# Patient Record
Sex: FEMALE | Race: OTHER | Employment: PART TIME | URBAN - METROPOLITAN AREA
[De-identification: names, ages, dates, MRNs, and addresses within clinical notes are randomized per-mention and may not be internally consistent; named-entity substitution may affect disease eponyms.]

---

## 2017-01-24 ENCOUNTER — OFFICE VISIT (OUTPATIENT)
Dept: INTERNAL MEDICINE | Age: 47
End: 2017-01-24

## 2017-01-24 VITALS
HEIGHT: 60 IN | WEIGHT: 127 LBS | DIASTOLIC BLOOD PRESSURE: 80 MMHG | BODY MASS INDEX: 24.94 KG/M2 | SYSTOLIC BLOOD PRESSURE: 110 MMHG | HEART RATE: 65 BPM | TEMPERATURE: 97.6 F

## 2017-01-24 DIAGNOSIS — F43.23 ADJUSTMENT DISORDER WITH MIXED ANXIETY AND DEPRESSED MOOD: ICD-10-CM

## 2017-01-24 DIAGNOSIS — N95.1 HOT FLUSHES, PERIMENOPAUSAL: ICD-10-CM

## 2017-01-24 DIAGNOSIS — Z00.00 ANNUAL PHYSICAL EXAM: Primary | ICD-10-CM

## 2017-01-24 PROCEDURE — 99396 PREV VISIT EST AGE 40-64: CPT | Performed by: INTERNAL MEDICINE

## 2017-01-24 ASSESSMENT — ENCOUNTER SYMPTOMS
PHOTOPHOBIA: 0
ABDOMINAL DISTENTION: 0
SHORTNESS OF BREATH: 0
RESPIRATORY NEGATIVE: 1
COLOR CHANGE: 0
SINUS PRESSURE: 0
EYE PAIN: 0
GASTROINTESTINAL NEGATIVE: 1
CHEST TIGHTNESS: 0
BLOOD IN STOOL: 0
COUGH: 0
ABDOMINAL PAIN: 0

## 2017-01-24 ASSESSMENT — PATIENT HEALTH QUESTIONNAIRE - PHQ9
1. LITTLE INTEREST OR PLEASURE IN DOING THINGS: 0
SUM OF ALL RESPONSES TO PHQ9 QUESTIONS 1 & 2: 0
SUM OF ALL RESPONSES TO PHQ QUESTIONS 1-9: 0
2. FEELING DOWN, DEPRESSED OR HOPELESS: 0

## 2017-01-25 ENCOUNTER — OFFICE VISIT (OUTPATIENT)
Dept: SURGERY | Age: 47
End: 2017-01-25

## 2017-01-25 VITALS
BODY MASS INDEX: 25.72 KG/M2 | DIASTOLIC BLOOD PRESSURE: 70 MMHG | HEART RATE: 60 BPM | WEIGHT: 131 LBS | HEIGHT: 60 IN | SYSTOLIC BLOOD PRESSURE: 110 MMHG

## 2017-01-25 DIAGNOSIS — N95.1 POST MENOPAUSAL SYNDROME: ICD-10-CM

## 2017-01-25 DIAGNOSIS — E03.9 HYPOTHYROIDISM, UNSPECIFIED TYPE: Primary | ICD-10-CM

## 2017-01-25 DIAGNOSIS — E55.9 VITAMIN D DEFICIENCY: ICD-10-CM

## 2017-01-25 DIAGNOSIS — E78.00 HYPERCHOLESTEROLEMIA: ICD-10-CM

## 2017-01-25 PROCEDURE — 99213 OFFICE O/P EST LOW 20 MIN: CPT | Performed by: INTERNAL MEDICINE

## 2017-01-25 PROCEDURE — G8484 FLU IMMUNIZE NO ADMIN: HCPCS | Performed by: INTERNAL MEDICINE

## 2017-01-25 PROCEDURE — G8419 CALC BMI OUT NRM PARAM NOF/U: HCPCS | Performed by: INTERNAL MEDICINE

## 2017-01-25 PROCEDURE — 1036F TOBACCO NON-USER: CPT | Performed by: INTERNAL MEDICINE

## 2017-01-25 PROCEDURE — G8427 DOCREV CUR MEDS BY ELIG CLIN: HCPCS | Performed by: INTERNAL MEDICINE

## 2017-01-25 RX ORDER — ERGOCALCIFEROL 1.25 MG/1
50000 CAPSULE ORAL WEEKLY
Qty: 12 CAPSULE | Refills: 6 | Status: SHIPPED | OUTPATIENT
Start: 2017-01-25 | End: 2017-06-14 | Stop reason: SDUPTHER

## 2017-01-25 RX ORDER — LEVOTHYROXINE SODIUM 125 MCG
125 TABLET ORAL DAILY
Qty: 30 TABLET | Refills: 6 | Status: SHIPPED | OUTPATIENT
Start: 2017-01-25 | End: 2017-06-14 | Stop reason: SDUPTHER

## 2017-06-14 DIAGNOSIS — E55.9 VITAMIN D DEFICIENCY: ICD-10-CM

## 2017-06-14 DIAGNOSIS — E03.9 HYPOTHYROIDISM, UNSPECIFIED TYPE: ICD-10-CM

## 2017-06-14 RX ORDER — ERGOCALCIFEROL 1.25 MG/1
50000 CAPSULE ORAL WEEKLY
Qty: 12 CAPSULE | Refills: 0 | Status: SHIPPED | OUTPATIENT
Start: 2017-06-14 | End: 2017-12-07 | Stop reason: SDUPTHER

## 2017-06-14 RX ORDER — LEVOTHYROXINE SODIUM 125 MCG
125 TABLET ORAL DAILY
Qty: 90 TABLET | Refills: 0 | Status: SHIPPED | OUTPATIENT
Start: 2017-06-14 | End: 2017-11-30 | Stop reason: SDUPTHER

## 2017-11-30 DIAGNOSIS — E03.9 HYPOTHYROIDISM, UNSPECIFIED TYPE: ICD-10-CM

## 2017-11-30 RX ORDER — LEVOTHYROXINE SODIUM 125 MCG
125 TABLET ORAL DAILY
Qty: 28 TABLET | Refills: 0 | COMMUNITY
Start: 2017-11-30 | End: 2017-12-07 | Stop reason: SDUPTHER

## 2017-11-30 RX ORDER — LEVOTHYROXINE SODIUM 125 MCG
125 TABLET ORAL DAILY
Qty: 90 TABLET | Refills: 0 | Status: SHIPPED | OUTPATIENT
Start: 2017-11-30 | End: 2017-11-30 | Stop reason: SDUPTHER

## 2017-12-07 DIAGNOSIS — E03.9 HYPOTHYROIDISM, UNSPECIFIED TYPE: ICD-10-CM

## 2017-12-07 DIAGNOSIS — E55.9 VITAMIN D DEFICIENCY: ICD-10-CM

## 2017-12-07 RX ORDER — LEVOTHYROXINE SODIUM 125 MCG
125 TABLET ORAL DAILY
Qty: 90 TABLET | Refills: 1 | Status: SHIPPED | OUTPATIENT
Start: 2017-12-07 | End: 2017-12-08 | Stop reason: SDUPTHER

## 2017-12-07 RX ORDER — ERGOCALCIFEROL 1.25 MG/1
50000 CAPSULE ORAL WEEKLY
Qty: 12 CAPSULE | Refills: 1 | Status: SHIPPED | OUTPATIENT
Start: 2017-12-07 | End: 2018-02-22 | Stop reason: SDUPTHER

## 2017-12-08 DIAGNOSIS — E03.9 HYPOTHYROIDISM, UNSPECIFIED TYPE: ICD-10-CM

## 2017-12-08 RX ORDER — LEVOTHYROXINE SODIUM 125 MCG
125 TABLET ORAL DAILY
Qty: 90 TABLET | Refills: 1 | Status: SHIPPED | OUTPATIENT
Start: 2017-12-08 | End: 2017-12-14 | Stop reason: SDUPTHER

## 2017-12-14 DIAGNOSIS — E03.9 HYPOTHYROIDISM, UNSPECIFIED TYPE: ICD-10-CM

## 2017-12-14 RX ORDER — LEVOTHYROXINE SODIUM 125 MCG
125 TABLET ORAL DAILY
Qty: 90 TABLET | Refills: 1 | Status: SHIPPED | OUTPATIENT
Start: 2017-12-14 | End: 2018-02-22 | Stop reason: SDUPTHER

## 2018-02-22 DIAGNOSIS — E03.9 HYPOTHYROIDISM, UNSPECIFIED TYPE: ICD-10-CM

## 2018-02-22 DIAGNOSIS — E55.9 VITAMIN D DEFICIENCY: ICD-10-CM

## 2018-02-22 RX ORDER — ERGOCALCIFEROL 1.25 MG/1
50000 CAPSULE ORAL WEEKLY
Qty: 12 CAPSULE | Refills: 0 | Status: SHIPPED | OUTPATIENT
Start: 2018-02-22 | End: 2018-06-19 | Stop reason: SDUPTHER

## 2018-02-22 RX ORDER — LEVOTHYROXINE SODIUM 125 MCG
125 TABLET ORAL DAILY
Qty: 90 TABLET | Refills: 0 | Status: SHIPPED | OUTPATIENT
Start: 2018-02-22 | End: 2018-09-25 | Stop reason: SDUPTHER

## 2018-02-22 NOTE — TELEPHONE ENCOUNTER
Patient called to inform the office that she can no longer use local pharmacy for scripts. She now needs Mail away 90 day supply.

## 2018-03-02 ENCOUNTER — TELEPHONE (OUTPATIENT)
Dept: INTERNAL MEDICINE CLINIC | Age: 48
End: 2018-03-02

## 2018-03-06 DIAGNOSIS — Z00.00 ENCOUNTER FOR ANNUAL PHYSICAL EXAM: Primary | ICD-10-CM

## 2018-03-23 ENCOUNTER — OFFICE VISIT (OUTPATIENT)
Dept: INTERNAL MEDICINE CLINIC | Age: 48
End: 2018-03-23
Payer: COMMERCIAL

## 2018-03-23 VITALS
TEMPERATURE: 98.4 F | HEART RATE: 62 BPM | HEIGHT: 60 IN | DIASTOLIC BLOOD PRESSURE: 80 MMHG | SYSTOLIC BLOOD PRESSURE: 110 MMHG | OXYGEN SATURATION: 99 % | BODY MASS INDEX: 24.94 KG/M2 | WEIGHT: 127 LBS

## 2018-03-23 DIAGNOSIS — Z00.00 ENCOUNTER FOR ANNUAL PHYSICAL EXAM: ICD-10-CM

## 2018-03-23 DIAGNOSIS — Z00.00 ANNUAL PHYSICAL EXAM: Primary | ICD-10-CM

## 2018-03-23 LAB
ANION GAP SERPL CALCULATED.3IONS-SCNC: 15 MEQ/L (ref 7–13)
BACTERIA: NORMAL /HPF
BILIRUBIN URINE: NEGATIVE
BLOOD, URINE: ABNORMAL
BUN BLDV-MCNC: 14 MG/DL (ref 6–20)
CALCIUM SERPL-MCNC: 9.4 MG/DL (ref 8.6–10.2)
CHLORIDE BLD-SCNC: 102 MEQ/L (ref 98–107)
CHOLESTEROL, TOTAL: 245 MG/DL (ref 0–199)
CLARITY: ABNORMAL
CO2: 25 MEQ/L (ref 22–29)
COLOR: YELLOW
CREAT SERPL-MCNC: 0.46 MG/DL (ref 0.5–0.9)
EPITHELIAL CELLS, UA: NORMAL /HPF
GFR AFRICAN AMERICAN: >60
GFR NON-AFRICAN AMERICAN: >60
GLUCOSE BLD-MCNC: 86 MG/DL (ref 74–109)
GLUCOSE URINE: NEGATIVE MG/DL
HCT VFR BLD CALC: 41.1 % (ref 37–47)
HDLC SERPL-MCNC: 77 MG/DL (ref 40–59)
HEMOGLOBIN: 14 G/DL (ref 12–16)
KETONES, URINE: NEGATIVE MG/DL
LDL CHOLESTEROL CALCULATED: 153 MG/DL (ref 0–129)
LEUKOCYTE ESTERASE, URINE: NEGATIVE
MCH RBC QN AUTO: 33.3 PG (ref 27–31.3)
MCHC RBC AUTO-ENTMCNC: 34 % (ref 33–37)
MCV RBC AUTO: 97.8 FL (ref 82–100)
NITRITE, URINE: NEGATIVE
PDW BLD-RTO: 13.6 % (ref 11.5–14.5)
PH UA: 5.5 (ref 5–9)
PLATELET # BLD: 167 K/UL (ref 130–400)
POTASSIUM SERPL-SCNC: 4.7 MEQ/L (ref 3.5–5.1)
PROTEIN UA: NEGATIVE MG/DL
RBC # BLD: 4.2 M/UL (ref 4.2–5.4)
RBC UA: NORMAL /HPF (ref 0–2)
SODIUM BLD-SCNC: 142 MEQ/L (ref 132–144)
SPECIFIC GRAVITY UA: 1 (ref 1–1.03)
TRIGL SERPL-MCNC: 75 MG/DL (ref 0–200)
UROBILINOGEN, URINE: 0.2 E.U./DL
WBC # BLD: 2.9 K/UL (ref 4.8–10.8)
WBC UA: NORMAL /HPF (ref 0–5)

## 2018-03-23 PROCEDURE — 99396 PREV VISIT EST AGE 40-64: CPT | Performed by: INTERNAL MEDICINE

## 2018-03-23 RX ORDER — CALCIUM CARBONATE 500(1250)
500 TABLET ORAL DAILY
COMMUNITY

## 2018-03-23 ASSESSMENT — ENCOUNTER SYMPTOMS
RHINORRHEA: 0
SHORTNESS OF BREATH: 0
COLOR CHANGE: 0
ABDOMINAL PAIN: 0
RESPIRATORY NEGATIVE: 1
VOICE CHANGE: 0
EYE PAIN: 0
ABDOMINAL DISTENTION: 0
CHEST TIGHTNESS: 0
GASTROINTESTINAL NEGATIVE: 1
PHOTOPHOBIA: 0
COUGH: 0
BLOOD IN STOOL: 0

## 2018-03-23 NOTE — PROGRESS NOTES
History of mammogram 03/20/2012    History of ovarian cyst 2013    Hypothyroidism     Dr Cee Alfred Raynaud phenomenon 2015       Past Surgical History:   Procedure Laterality Date    ABDOMINOPLASTY         Social History     Social History    Marital status:      Spouse name: N/A    Number of children: 3    Years of education: N/A     Occupational History    homemaker, Micaela [employer]      Social History Main Topics    Smoking status: Never Smoker    Smokeless tobacco: Never Used    Alcohol use No    Drug use: No    Sexual activity: Not on file     Other Topics Concern    Not on file     Social History Narrative    Native of [facility]    Lives in a house in [place] with spouse (pulmonologist)     3 children in college    Works at [employer]   Problem Relation Age of Onset    High Cholesterol Father        Family and social history were reviewed and pertinent changes documented. ALLERGIES    Patient has no known allergies. Current Outpatient Prescriptions on File Prior to Visit   Medication Sig Dispense Refill    vitamin D (ERGOCALCIFEROL) 06540 units CAPS capsule Take 1 capsule by mouth once a week 12 capsule 0    SYNTHROID 125 MCG tablet Take 1 tablet by mouth Daily 90 tablet 0    Omega-3 Fatty Acids (FISH OIL PO) Take  by mouth.  ferrous sulfate 325 (65 FE) MG tablet Take 325 mg by mouth daily (with breakfast).  MULTIPLE VITAMINS PO Take  by mouth.  levonorgestrel (MIRENA) 20 MCG/24HR IUD 1 each by Intrauterine route once for 1 dose. 1 Intra Uterine Device 0     No current facility-administered medications on file prior to visit. Review of Systems   Constitutional: Positive for diaphoresis (night sweats, once during the night). Negative for activity change, appetite change, fatigue and unexpected weight change. HENT: Negative for congestion, dental problem, nosebleeds, rhinorrhea, tinnitus and voice change.     Eyes: Negative for photophobia, pain Palpation reveals no dominant mass and no tenderness. The axillary and supraclavicular lymph nodes are not enlarged. Musculoskeletal: Normal range of motion. Lymphadenopathy:     She has no cervical adenopathy. Neurological: She is alert and oriented to person, place, and time. She has normal reflexes. Skin: Skin is warm and dry. No rash noted. Psychiatric: She has a normal mood and affect. Her speech is normal and behavior is normal. Judgment normal. Her mood appears not anxious. She is not slowed and not withdrawn. Cognition and memory are normal. She does not express inappropriate judgment. She does not exhibit a depressed mood. She exhibits normal recent memory and normal remote memory. Less anxiety, mood brighter She is attentive. Assessment:    Jazmin was seen today for annual exam and gynecologic exam.    Diagnoses and all orders for this visit:    Annual physical exam  Comments:  well exam, no new health issues  Has IUD which was placed 5 years ago by previous gynecologist.  Referred to gynecologist for IUD check up  Pap test deferred to gynecologist    Other orders  -     Cancel: Pap Smear  -     MD Evelina        Plan:    Reviewed with the patient (/and caregiver if present): current health status, medications, activities and diet. See also orders and comments in the assessment section. Patient given positive feedback and encouraged to continue the current healthy lifestyle, call with any concerns.     Orders Placed This Encounter   Procedures   MD Evelina     Referral Priority:   Routine     Referral Type:   Consult for Advice and Opinion     Referral Reason:   Specialty Services Required     Referred to Provider:   Wallace Buckley MD     Requested Specialty:   Obstetrics & Gynecology     Number of Visits Requested:   1     Return in about 1 year (around 3/23/2019) for annual.    I have reviewed the patient's medical and surgical, family

## 2018-04-04 ENCOUNTER — OFFICE VISIT (OUTPATIENT)
Dept: OBGYN CLINIC | Age: 48
End: 2018-04-04
Payer: COMMERCIAL

## 2018-04-04 ENCOUNTER — OFFICE VISIT (OUTPATIENT)
Dept: INTERNAL MEDICINE CLINIC | Age: 48
End: 2018-04-04
Payer: COMMERCIAL

## 2018-04-04 VITALS
SYSTOLIC BLOOD PRESSURE: 104 MMHG | HEIGHT: 60 IN | HEART RATE: 68 BPM | DIASTOLIC BLOOD PRESSURE: 66 MMHG | BODY MASS INDEX: 25.32 KG/M2 | WEIGHT: 129 LBS

## 2018-04-04 VITALS
HEIGHT: 60 IN | BODY MASS INDEX: 25.32 KG/M2 | OXYGEN SATURATION: 99 % | HEART RATE: 61 BPM | TEMPERATURE: 98.2 F | SYSTOLIC BLOOD PRESSURE: 100 MMHG | WEIGHT: 129 LBS | DIASTOLIC BLOOD PRESSURE: 70 MMHG

## 2018-04-04 DIAGNOSIS — E78.5 HYPERLIPIDEMIA, UNSPECIFIED HYPERLIPIDEMIA TYPE: Primary | Chronic | ICD-10-CM

## 2018-04-04 DIAGNOSIS — Z97.5 IUD (INTRAUTERINE DEVICE) IN PLACE: ICD-10-CM

## 2018-04-04 DIAGNOSIS — R31.29 HEMATURIA, MICROSCOPIC: ICD-10-CM

## 2018-04-04 DIAGNOSIS — Z12.31 VISIT FOR SCREENING MAMMOGRAM: ICD-10-CM

## 2018-04-04 DIAGNOSIS — Z01.419 VISIT FOR GYNECOLOGIC EXAMINATION: ICD-10-CM

## 2018-04-04 DIAGNOSIS — Z01.419 VISIT FOR GYNECOLOGIC EXAMINATION: Primary | ICD-10-CM

## 2018-04-04 PROCEDURE — 99212 OFFICE O/P EST SF 10 MIN: CPT | Performed by: INTERNAL MEDICINE

## 2018-04-04 PROCEDURE — 99386 PREV VISIT NEW AGE 40-64: CPT | Performed by: OBSTETRICS & GYNECOLOGY

## 2018-04-04 ASSESSMENT — ENCOUNTER SYMPTOMS
RESPIRATORY NEGATIVE: 1
GASTROINTESTINAL NEGATIVE: 1

## 2018-04-05 ENCOUNTER — OFFICE VISIT (OUTPATIENT)
Dept: ENDOCRINOLOGY | Age: 48
End: 2018-04-05
Payer: COMMERCIAL

## 2018-04-05 VITALS
SYSTOLIC BLOOD PRESSURE: 119 MMHG | WEIGHT: 127 LBS | DIASTOLIC BLOOD PRESSURE: 79 MMHG | BODY MASS INDEX: 24.94 KG/M2 | HEART RATE: 60 BPM | HEIGHT: 60 IN

## 2018-04-05 DIAGNOSIS — E03.9 HYPOTHYROIDISM, UNSPECIFIED TYPE: Primary | ICD-10-CM

## 2018-04-05 DIAGNOSIS — E78.00 HYPERCHOLESTEREMIA: ICD-10-CM

## 2018-04-05 DIAGNOSIS — Z01.419 VISIT FOR GYNECOLOGIC EXAMINATION: ICD-10-CM

## 2018-04-05 DIAGNOSIS — E55.9 VITAMIN D DEFICIENCY: ICD-10-CM

## 2018-04-05 LAB
FOLLICLE STIMULATING HORMONE: 151.5 MIU/ML
T4 FREE: 1.57 NG/DL (ref 0.93–1.7)

## 2018-04-05 PROCEDURE — 99213 OFFICE O/P EST LOW 20 MIN: CPT | Performed by: INTERNAL MEDICINE

## 2018-04-11 LAB
HPV COMMENT: NORMAL
HPV TYPE 16: NOT DETECTED
HPV TYPE 18: NOT DETECTED
HPVOH (OTHER TYPES): NOT DETECTED

## 2018-04-23 ENCOUNTER — HOSPITAL ENCOUNTER (OUTPATIENT)
Dept: ULTRASOUND IMAGING | Age: 48
Discharge: HOME | End: 2018-04-25
Payer: COMMERCIAL

## 2018-04-23 DIAGNOSIS — Z01.419 VISIT FOR GYNECOLOGIC EXAMINATION: ICD-10-CM

## 2018-04-23 PROCEDURE — 76830 TRANSVAGINAL US NON-OB: CPT

## 2018-04-23 PROCEDURE — 76856 US EXAM PELVIC COMPLETE: CPT

## 2018-04-25 ENCOUNTER — PROCEDURE VISIT (OUTPATIENT)
Dept: OBGYN CLINIC | Age: 48
End: 2018-04-25
Payer: COMMERCIAL

## 2018-04-25 VITALS
SYSTOLIC BLOOD PRESSURE: 124 MMHG | HEART RATE: 68 BPM | BODY MASS INDEX: 25 KG/M2 | WEIGHT: 128 LBS | DIASTOLIC BLOOD PRESSURE: 76 MMHG

## 2018-04-25 DIAGNOSIS — Z30.433 ENCOUNTER FOR IUD REMOVAL AND REINSERTION: Primary | ICD-10-CM

## 2018-04-25 DIAGNOSIS — N92.6 IRREGULAR BLEEDING: ICD-10-CM

## 2018-04-25 LAB
CONTROL: YES
PREGNANCY TEST URINE, POC: NORMAL

## 2018-04-25 PROCEDURE — 58300 INSERT INTRAUTERINE DEVICE: CPT | Performed by: OBSTETRICS & GYNECOLOGY

## 2018-04-25 PROCEDURE — 81025 URINE PREGNANCY TEST: CPT | Performed by: OBSTETRICS & GYNECOLOGY

## 2018-04-25 PROCEDURE — 58301 REMOVE INTRAUTERINE DEVICE: CPT | Performed by: OBSTETRICS & GYNECOLOGY

## 2018-04-25 PROCEDURE — 99999 PR OFFICE/OUTPT VISIT,PROCEDURE ONLY: CPT | Performed by: OBSTETRICS & GYNECOLOGY

## 2018-04-26 ENCOUNTER — EMPLOYEE WELLNESS (OUTPATIENT)
Dept: INTERNAL MEDICINE CLINIC | Age: 48
End: 2018-04-26

## 2018-04-26 LAB
CHOLESTEROL, TOTAL: 191 MG/DL (ref 0–199)
GLUCOSE BLD-MCNC: 66 MG/DL (ref 74–109)
HDLC SERPL-MCNC: 65 MG/DL (ref 40–59)
LDL CHOLESTEROL CALCULATED: 114 MG/DL (ref 0–129)
TRIGL SERPL-MCNC: 62 MG/DL (ref 0–200)

## 2018-05-02 VITALS — BODY MASS INDEX: 24.41 KG/M2 | WEIGHT: 125 LBS

## 2018-05-04 PROBLEM — Z12.31 VISIT FOR SCREENING MAMMOGRAM: Status: RESOLVED | Noted: 2018-04-04 | Resolved: 2018-05-04

## 2018-05-04 PROBLEM — Z01.419 VISIT FOR GYNECOLOGIC EXAMINATION: Status: RESOLVED | Noted: 2018-04-04 | Resolved: 2018-05-04

## 2018-05-08 ENCOUNTER — HOSPITAL ENCOUNTER (OUTPATIENT)
Dept: WOMENS IMAGING | Age: 48
Discharge: HOME | End: 2018-05-10
Payer: COMMERCIAL

## 2018-05-08 DIAGNOSIS — Z12.31 VISIT FOR SCREENING MAMMOGRAM: ICD-10-CM

## 2018-05-08 PROCEDURE — 77063 BREAST TOMOSYNTHESIS BI: CPT

## 2018-05-29 ENCOUNTER — OFFICE VISIT (OUTPATIENT)
Dept: OBGYN CLINIC | Age: 48
End: 2018-05-29
Payer: COMMERCIAL

## 2018-05-29 VITALS
BODY MASS INDEX: 24.54 KG/M2 | DIASTOLIC BLOOD PRESSURE: 78 MMHG | WEIGHT: 125 LBS | SYSTOLIC BLOOD PRESSURE: 106 MMHG | HEIGHT: 60 IN | HEART RATE: 80 BPM

## 2018-05-29 DIAGNOSIS — Z30.431 IUD CHECK UP: Primary | ICD-10-CM

## 2018-05-29 PROCEDURE — 99213 OFFICE O/P EST LOW 20 MIN: CPT | Performed by: OBSTETRICS & GYNECOLOGY

## 2018-06-19 DIAGNOSIS — E55.9 VITAMIN D DEFICIENCY: ICD-10-CM

## 2018-06-19 PROBLEM — Z30.431 IUD CHECK UP: Status: ACTIVE | Noted: 2018-06-19

## 2018-06-19 RX ORDER — ERGOCALCIFEROL 1.25 MG/1
50000 CAPSULE ORAL WEEKLY
Qty: 12 CAPSULE | Refills: 0 | Status: SHIPPED | OUTPATIENT
Start: 2018-06-19 | End: 2018-09-18 | Stop reason: SDUPTHER

## 2018-09-17 DIAGNOSIS — E55.9 VITAMIN D DEFICIENCY: ICD-10-CM

## 2018-09-18 ENCOUNTER — TELEPHONE (OUTPATIENT)
Dept: ENDOCRINOLOGY | Age: 48
End: 2018-09-18

## 2018-09-18 DIAGNOSIS — E55.9 VITAMIN D DEFICIENCY: ICD-10-CM

## 2018-09-18 RX ORDER — ERGOCALCIFEROL 1.25 MG/1
50000 CAPSULE ORAL WEEKLY
Qty: 12 CAPSULE | Refills: 0 | Status: CANCELLED | OUTPATIENT
Start: 2018-09-18

## 2018-09-18 RX ORDER — ERGOCALCIFEROL 1.25 MG/1
50000 CAPSULE ORAL WEEKLY
Qty: 12 CAPSULE | Refills: 0 | Status: SHIPPED | OUTPATIENT
Start: 2018-09-18 | End: 2019-05-24 | Stop reason: SDUPTHER

## 2018-09-18 NOTE — TELEPHONE ENCOUNTER
Patient is requesting to have 90 day supply for vitamin D to [facility]

## 2018-09-24 RX ORDER — ERGOCALCIFEROL 1.25 MG/1
50000 CAPSULE ORAL WEEKLY
Qty: 12 CAPSULE | Refills: 0 | OUTPATIENT
Start: 2018-09-24

## 2018-09-25 DIAGNOSIS — E03.9 HYPOTHYROIDISM, UNSPECIFIED TYPE: ICD-10-CM

## 2018-09-26 RX ORDER — LEVOTHYROXINE SODIUM 125 MCG
125 TABLET ORAL DAILY
Qty: 90 TABLET | Refills: 1 | Status: SHIPPED | OUTPATIENT
Start: 2018-09-26 | End: 2019-05-24 | Stop reason: SDUPTHER

## 2019-05-08 DIAGNOSIS — E55.9 VITAMIN D DEFICIENCY: ICD-10-CM

## 2019-05-08 DIAGNOSIS — E03.9 HYPOTHYROIDISM, UNSPECIFIED TYPE: ICD-10-CM

## 2019-05-08 RX ORDER — LEVOTHYROXINE SODIUM 125 MCG
125 TABLET ORAL DAILY
Qty: 90 TABLET | Refills: 1 | OUTPATIENT
Start: 2019-05-08

## 2019-05-08 RX ORDER — ERGOCALCIFEROL 1.25 MG/1
50000 CAPSULE ORAL WEEKLY
Qty: 12 CAPSULE | Refills: 0 | OUTPATIENT
Start: 2019-05-08

## 2019-05-14 ENCOUNTER — HOSPITAL ENCOUNTER (OUTPATIENT)
Dept: WOMENS IMAGING | Age: 49
Discharge: HOME | End: 2019-05-16
Payer: COMMERCIAL

## 2019-05-14 ENCOUNTER — TELEPHONE (OUTPATIENT)
Dept: INTERNAL MEDICINE CLINIC | Age: 49
End: 2019-05-14

## 2019-05-14 DIAGNOSIS — Z12.39 BREAST SCREENING: Primary | ICD-10-CM

## 2019-05-14 DIAGNOSIS — Z12.39 BREAST SCREENING: ICD-10-CM

## 2019-05-14 PROCEDURE — 77063 BREAST TOMOSYNTHESIS BI: CPT

## 2019-05-24 ENCOUNTER — OFFICE VISIT (OUTPATIENT)
Dept: ENDOCRINOLOGY | Age: 49
End: 2019-05-24
Payer: COMMERCIAL

## 2019-05-24 VITALS
SYSTOLIC BLOOD PRESSURE: 114 MMHG | HEART RATE: 68 BPM | BODY MASS INDEX: 24.94 KG/M2 | WEIGHT: 127 LBS | DIASTOLIC BLOOD PRESSURE: 79 MMHG | HEIGHT: 60 IN

## 2019-05-24 DIAGNOSIS — E55.9 VITAMIN D DEFICIENCY: ICD-10-CM

## 2019-05-24 DIAGNOSIS — N95.9 POST MENOPAUSAL PROBLEMS: ICD-10-CM

## 2019-05-24 DIAGNOSIS — E03.9 HYPOTHYROIDISM, UNSPECIFIED TYPE: Primary | ICD-10-CM

## 2019-05-24 DIAGNOSIS — E78.00 HYPERCHOLESTEREMIA: ICD-10-CM

## 2019-05-24 PROCEDURE — 99213 OFFICE O/P EST LOW 20 MIN: CPT | Performed by: INTERNAL MEDICINE

## 2019-05-24 RX ORDER — LEVOTHYROXINE SODIUM 125 MCG
125 TABLET ORAL DAILY
Qty: 90 TABLET | Refills: 3 | Status: SHIPPED | OUTPATIENT
Start: 2019-05-24 | End: 2019-08-26 | Stop reason: SDUPTHER

## 2019-05-24 RX ORDER — LEVOTHYROXINE SODIUM 125 MCG
125 TABLET ORAL DAILY
Qty: 90 TABLET | Refills: 3 | Status: SHIPPED | OUTPATIENT
Start: 2019-05-24 | End: 2019-05-24 | Stop reason: SDUPTHER

## 2019-05-24 RX ORDER — ERGOCALCIFEROL 1.25 MG/1
50000 CAPSULE ORAL WEEKLY
Qty: 12 CAPSULE | Refills: 0 | Status: SHIPPED | OUTPATIENT
Start: 2019-05-24 | End: 2019-08-26 | Stop reason: SDUPTHER

## 2019-06-02 NOTE — PROGRESS NOTES
Subjective:      Patient ID: Susanne Mckeon is a 47 y.o. female. 12 month f/u on hypothyroidism   Hyperlipidemia   This is a chronic problem. The current episode started more than 1 year ago. The problem is uncontrolled. Exacerbating diseases include hypothyroidism. Risk factors for coronary artery disease include dyslipidemia. Other   This is a chronic (hypothyroidism ) problem. The current episode started more than 1 year ago. The problem has been unchanged. Treatments tried: synthyroid teresa. The treatment provided moderate relief.        c/o  post menopausal symptoms wants labs done    No recent labs    Vit  D def on replacement     Patient Active Problem List   Diagnosis    History of mammogram    Hypothyroidism    Raynaud's phenomenon without gangrene    IUD (intrauterine device) in place    Encounter for IUD removal and reinsertion    Irregular bleeding    IUD check up       Allergies   Allergen Reactions    Doxycycline        Current Outpatient Medications:     vitamin D (ERGOCALCIFEROL) 24039 units CAPS capsule, Take 1 capsule by mouth once a week, Disp: 12 capsule, Rfl: 0    SYNTHROID 125 MCG tablet, Take 1 tablet by mouth Daily, Disp: 90 tablet, Rfl: 3    calcium carbonate (OSCAL) 500 MG TABS tablet, Take 500 mg by mouth daily, Disp: , Rfl:     Omega-3 Fatty Acids (FISH OIL PO), Take  by mouth., Disp: , Rfl:     ferrous sulfate 325 (65 FE) MG tablet, Take 325 mg by mouth daily (with breakfast). , Disp: , Rfl:     MULTIPLE VITAMINS PO, Take  by mouth.  , Disp: , Rfl:     levonorgestrel (MIRENA) 20 MCG/24HR IUD, 1 each by Intrauterine route once for 1 dose., Disp: 1 Intra Uterine Device, Rfl: 0    Review of Systems   All other systems reviewed and are negative. Vitals:    05/24/19 1537   BP: 114/79   Pulse: 68   Weight: 127 lb (57.6 kg)   Height: 5' (1.524 m)       Objective:   Physical Exam   Constitutional: She appears well-developed and well-nourished.    HENT:   Head: Normocephalic and atraumatic. Cardiovascular: Normal rate. Musculoskeletal: Normal range of motion. Neurological: She is alert. Assessment:       Diagnosis Orders   1. Hypothyroidism, unspecified type  TSH with Reflex    T4, Free    SYNTHROID 125 MCG tablet    DISCONTINUED: SYNTHROID 125 MCG tablet   2. Hypercholesteremia  Basic Metabolic Panel    Lipid Panel   3. Vitamin D deficiency  Vitamin D 25 Hydroxy    vitamin D (ERGOCALCIFEROL) 22167 units CAPS capsule   4.  Post menopausal problems  Follicle Stimulating Hormone    Luteinizing Hormone    Estradiol           Plan:      Orders Placed This Encounter   Procedures    TSH with Reflex     Standing Status:   Future     Standing Expiration Date:   5/24/2020    T4, Free     Standing Status:   Future     Standing Expiration Date:   5/24/2020    Basic Metabolic Panel     Standing Status:   Future     Standing Expiration Date:   5/24/2020    Lipid Panel     Standing Status:   Future     Standing Expiration Date:   5/24/2020     Order Specific Question:   Is Patient Fasting?/# of Hours     Answer:   y    Vitamin D 25 Hydroxy     Standing Status:   Future     Standing Expiration Date:   7/76/2285   Miguel Tobaraimee Follicle Stimulating Hormone     Standing Status:   Future     Standing Expiration Date:   5/24/2020    Luteinizing Hormone     Standing Status:   Future     Standing Expiration Date:   5/24/2020    Estradiol     Standing Status:   Future     Standing Expiration Date:   5/24/2020     Orders Placed This Encounter   Medications    DISCONTD: SYNTHROID 125 MCG tablet     Sig: Take 1 tablet by mouth Daily     Dispense:  90 tablet     Refill:  3    vitamin D (ERGOCALCIFEROL) 49843 units CAPS capsule     Sig: Take 1 capsule by mouth once a week     Dispense:  12 capsule     Refill:  0    SYNTHROID 125 MCG tablet     Sig: Take 1 tablet by mouth Daily     Dispense:  90 tablet     Refill:  3

## 2019-08-26 DIAGNOSIS — E55.9 VITAMIN D DEFICIENCY: ICD-10-CM

## 2019-08-26 DIAGNOSIS — E03.9 HYPOTHYROIDISM, UNSPECIFIED TYPE: ICD-10-CM

## 2019-08-26 RX ORDER — LEVOTHYROXINE SODIUM 125 MCG
125 TABLET ORAL DAILY
Qty: 90 TABLET | Refills: 3 | Status: SHIPPED | OUTPATIENT
Start: 2019-08-26 | End: 2020-01-21 | Stop reason: SDUPTHER

## 2019-08-26 RX ORDER — ERGOCALCIFEROL 1.25 MG/1
50000 CAPSULE ORAL WEEKLY
Qty: 12 CAPSULE | Refills: 0 | Status: SHIPPED | OUTPATIENT
Start: 2019-08-26 | End: 2019-11-21 | Stop reason: SDUPTHER

## 2019-08-26 NOTE — TELEPHONE ENCOUNTER
Please approve or deny this request.    Rx requested:  Requested Prescriptions     Pending Prescriptions Disp Refills    SYNTHROID 125 MCG tablet 90 tablet 3     Sig: Take 1 tablet by mouth Daily    vitamin D (ERGOCALCIFEROL) 37687 units CAPS capsule 12 capsule 0     Sig: Take 1 capsule by mouth once a week         Last Office Visit:   5/24/2019      Next Visit Date:  No future appointments.

## 2019-08-29 RX ORDER — CLINDAMYCIN PHOSPHATE AND TRETINOIN 10; .25 MG/G; MG/G
GEL TOPICAL
Qty: 180 G | Refills: 0 | Status: SHIPPED | OUTPATIENT
Start: 2019-08-29

## 2019-11-19 DIAGNOSIS — E55.9 VITAMIN D DEFICIENCY: ICD-10-CM

## 2019-11-21 RX ORDER — ERGOCALCIFEROL 1.25 MG/1
50000 CAPSULE ORAL WEEKLY
Qty: 12 CAPSULE | Refills: 0 | Status: SHIPPED | OUTPATIENT
Start: 2019-11-21 | End: 2020-01-21 | Stop reason: SDUPTHER

## 2020-01-21 ENCOUNTER — OFFICE VISIT (OUTPATIENT)
Dept: ENDOCRINOLOGY | Age: 50
End: 2020-01-21
Payer: COMMERCIAL

## 2020-01-21 VITALS
HEART RATE: 67 BPM | WEIGHT: 130 LBS | HEIGHT: 60 IN | DIASTOLIC BLOOD PRESSURE: 79 MMHG | SYSTOLIC BLOOD PRESSURE: 117 MMHG | BODY MASS INDEX: 25.52 KG/M2

## 2020-01-21 PROCEDURE — 99213 OFFICE O/P EST LOW 20 MIN: CPT | Performed by: INTERNAL MEDICINE

## 2020-01-21 RX ORDER — LEVOTHYROXINE SODIUM 125 MCG
125 TABLET ORAL DAILY
Qty: 90 TABLET | Refills: 3 | Status: SHIPPED | OUTPATIENT
Start: 2020-01-21 | End: 2020-04-27 | Stop reason: SDUPTHER

## 2020-01-21 RX ORDER — ERGOCALCIFEROL 1.25 MG/1
50000 CAPSULE ORAL WEEKLY
Qty: 12 CAPSULE | Refills: 3 | Status: SHIPPED | OUTPATIENT
Start: 2020-01-21 | End: 2021-01-11 | Stop reason: SDUPTHER

## 2020-01-27 NOTE — PROGRESS NOTES
Subjective:      Patient ID: Gema Alvarado is a 47 y.o. female. 7  month f/u on hypothyroidism hypocalcemia vitamin D deficiency  Patient complains of hot flashes night sweats  Hyperlipidemia   This is a chronic problem. The current episode started more than 1 year ago. The problem is uncontrolled. Exacerbating diseases include hypothyroidism. Other   This is a chronic (hypothyroidism ) problem. The current episode started more than 1 year ago. The problem has been unchanged. Treatments tried: synthyroid teresa. The treatment provided moderate relief. No recent labs to review      Patient Active Problem List   Diagnosis    History of mammogram    Hypothyroidism    Raynaud's phenomenon without gangrene    IUD (intrauterine device) in place    Encounter for IUD removal and reinsertion    Irregular bleeding    IUD check up       Allergies   Allergen Reactions    Doxycycline        Current Outpatient Medications:     SYNTHROID 125 MCG tablet, Take 1 tablet by mouth Daily, Disp: 90 tablet, Rfl: 3    vitamin D (ERGOCALCIFEROL) 1.25 MG (69180 UT) CAPS capsule, Take 1 capsule by mouth once a week, Disp: 12 capsule, Rfl: 3    clindamycin-tretinoin (ZIANA) 1.2-0.025 % gel, APPLY AS DIRECTED, Disp: 180 g, Rfl: 0    calcium carbonate (OSCAL) 500 MG TABS tablet, Take 500 mg by mouth daily, Disp: , Rfl:     Omega-3 Fatty Acids (FISH OIL PO), Take  by mouth., Disp: , Rfl:     ferrous sulfate 325 (65 FE) MG tablet, Take 325 mg by mouth daily (with breakfast). , Disp: , Rfl:     MULTIPLE VITAMINS PO, Take  by mouth.  , Disp: , Rfl:     levonorgestrel (MIRENA) 20 MCG/24HR IUD, 1 each by Intrauterine route once for 1 dose., Disp: 1 Intra Uterine Device, Rfl: 0    Review of Systems   All other systems reviewed and are negative. Vitals:    01/21/20 1544   BP: 117/79   Pulse: 67   Weight: 130 lb (59 kg)   Height: 5' (1.524 m)       Objective:   Physical Exam  Constitutional:       Appearance: Normal appearance. She is well-developed. HENT:      Head: Normocephalic and atraumatic. Neck:      Musculoskeletal: Normal range of motion and neck supple. Cardiovascular:      Rate and Rhythm: Normal rate. Musculoskeletal: Normal range of motion. Neurological:      Mental Status: She is alert. Psychiatric:         Mood and Affect: Mood normal.           Assessment:       Diagnosis Orders   1. Hyperlipidemia, unspecified hyperlipidemia type  Lipid Panel   2. Hypothyroidism, unspecified type  TSH with Reflex    T4, Free    SYNTHROID 125 MCG tablet   3. Vitamin D deficiency  vitamin D (ERGOCALCIFEROL) 1.25 MG (05039 UT) CAPS capsule   4.  Post-menopausal  Follicle Stimulating Hormone    Luteinizing Hormone    Estradiol    DEXA BONE DENSITY AXIAL SKELETON           Plan:      Orders Placed This Encounter   Procedures    DEXA BONE DENSITY AXIAL SKELETON     Standing Status:   Future     Standing Expiration Date:   1/21/2021     Order Specific Question:   Reason for exam:     Answer:   post menopause    TSH with Reflex     Standing Status:   Future     Standing Expiration Date:   1/21/2021    T4, Free     Standing Status:   Future     Standing Expiration Date:   1/21/2021    Lipid Panel     Standing Status:   Future     Standing Expiration Date:   1/21/2021     Order Specific Question:   Is Patient Fasting?/# of Hours     Answer:   y    Follicle Stimulating Hormone     Standing Status:   Future     Standing Expiration Date:   1/21/2021    Luteinizing Hormone     Standing Status:   Future     Standing Expiration Date:   1/21/2021    Estradiol     Standing Status:   Future     Standing Expiration Date:   1/21/2021     Orders Placed This Encounter   Medications    SYNTHROID 125 MCG tablet     Sig: Take 1 tablet by mouth Daily     Dispense:  90 tablet     Refill:  3    vitamin D (ERGOCALCIFEROL) 1.25 MG (73495 UT) CAPS capsule     Sig: Take 1 capsule by mouth once a week     Dispense:  12 capsule     Refill:  3 Continue vitamin D and Synthroid will get lab work for postmenopausal symptoms BMP A1c thyroid panel lipid panel  Will also order bone density

## 2020-02-11 DIAGNOSIS — E03.9 HYPOTHYROIDISM, UNSPECIFIED TYPE: ICD-10-CM

## 2020-02-11 DIAGNOSIS — Z78.0 POST-MENOPAUSAL: ICD-10-CM

## 2020-02-11 DIAGNOSIS — E78.5 HYPERLIPIDEMIA, UNSPECIFIED HYPERLIPIDEMIA TYPE: ICD-10-CM

## 2020-02-11 LAB
CHOLESTEROL, TOTAL: 206 MG/DL (ref 0–199)
ESTRADIOL LEVEL: <5 PG/ML
FOLLICLE STIMULATING HORMONE: 120 MIU/ML
HDLC SERPL-MCNC: 68 MG/DL (ref 40–59)
LDL CHOLESTEROL CALCULATED: 122 MG/DL (ref 0–129)
LUTEINIZING HORMONE: 51.1 MIU/ML
T4 FREE: 1.27 NG/DL (ref 0.84–1.68)
TRIGL SERPL-MCNC: 82 MG/DL (ref 0–150)
TSH REFLEX: 3.37 UIU/ML (ref 0.44–3.86)

## 2020-02-18 ENCOUNTER — HOSPITAL ENCOUNTER (OUTPATIENT)
Dept: WOMENS IMAGING | Age: 50
End: 2020-02-18
Payer: COMMERCIAL

## 2020-02-25 ENCOUNTER — HOSPITAL ENCOUNTER (OUTPATIENT)
Dept: WOMENS IMAGING | Age: 50
Discharge: HOME | End: 2020-02-27
Payer: COMMERCIAL

## 2020-02-25 PROCEDURE — 77080 DXA BONE DENSITY AXIAL: CPT

## 2020-03-03 ENCOUNTER — TELEPHONE (OUTPATIENT)
Dept: ENDOCRINOLOGY | Age: 50
End: 2020-03-03

## 2020-03-03 NOTE — TELEPHONE ENCOUNTER
Called patient to advise her of her Dexa scan results that were normal.  She asked if the test told us if she was port-menopausal yet?     Please advise at [phone]

## 2020-04-27 RX ORDER — LEVOTHYROXINE SODIUM 125 MCG
125 TABLET ORAL DAILY
Qty: 90 TABLET | Refills: 3 | Status: SHIPPED | OUTPATIENT
Start: 2020-04-27 | End: 2020-08-24 | Stop reason: SDUPTHER

## 2020-04-27 NOTE — TELEPHONE ENCOUNTER
Requested Prescriptions     Pending Prescriptions Disp Refills    SYNTHROID 125 MCG tablet 90 tablet 3     Sig: Take 1 tablet by mouth Daily

## 2020-06-03 ENCOUNTER — TELEPHONE (OUTPATIENT)
Dept: ADMINISTRATIVE | Age: 50
End: 2020-06-03

## 2020-06-04 NOTE — TELEPHONE ENCOUNTER
A 90 day supply was sent in with a years worth of refills a couple months ago, LM for pt to see what was going on cause she should be able to just go to pharmacy and get

## 2020-06-29 ENCOUNTER — TELEPHONE (OUTPATIENT)
Dept: ENDOCRINOLOGY | Age: 50
End: 2020-06-29

## 2020-07-07 ENCOUNTER — HOSPITAL ENCOUNTER (OUTPATIENT)
Dept: WOMENS IMAGING | Age: 50
Discharge: HOME | End: 2020-07-09
Payer: COMMERCIAL

## 2020-07-07 PROCEDURE — 77063 BREAST TOMOSYNTHESIS BI: CPT

## 2020-08-24 RX ORDER — LEVOTHYROXINE SODIUM 125 MCG
125 TABLET ORAL DAILY
Qty: 90 TABLET | Refills: 3 | Status: SHIPPED | OUTPATIENT
Start: 2020-08-24 | End: 2021-01-11 | Stop reason: SDUPTHER

## 2020-08-25 ENCOUNTER — EMPLOYEE WELLNESS (OUTPATIENT)
Dept: OTHER | Age: 50
End: 2020-08-25

## 2020-08-25 LAB
CHOLESTEROL, TOTAL: 229 MG/DL (ref 0–199)
GLUCOSE BLD-MCNC: 83 MG/DL (ref 70–99)
HBA1C MFR BLD: 5.4 % (ref 4.8–5.9)
HDLC SERPL-MCNC: 68 MG/DL (ref 40–59)
LDL CHOLESTEROL CALCULATED: 144 MG/DL (ref 0–129)
TRIGL SERPL-MCNC: 83 MG/DL (ref 0–150)

## 2020-09-30 DIAGNOSIS — E55.9 VITAMIN D DEFICIENCY: ICD-10-CM

## 2020-09-30 RX ORDER — ERGOCALCIFEROL 1.25 MG/1
50000 CAPSULE ORAL WEEKLY
Qty: 12 CAPSULE | Refills: 3 | Status: CANCELLED | OUTPATIENT
Start: 2020-09-30

## 2020-10-19 VITALS — BODY MASS INDEX: 24.8 KG/M2 | WEIGHT: 127 LBS

## 2021-01-11 DIAGNOSIS — E03.9 HYPOTHYROIDISM, UNSPECIFIED TYPE: ICD-10-CM

## 2021-01-11 DIAGNOSIS — E55.9 VITAMIN D DEFICIENCY: ICD-10-CM

## 2021-01-11 RX ORDER — ERGOCALCIFEROL 1.25 MG/1
50000 CAPSULE ORAL WEEKLY
Qty: 4 CAPSULE | Refills: 0 | Status: SHIPPED | OUTPATIENT
Start: 2021-01-11 | End: 2021-03-01 | Stop reason: SDUPTHER

## 2021-01-11 RX ORDER — LEVOTHYROXINE SODIUM 125 MCG
125 TABLET ORAL DAILY
Qty: 30 TABLET | Refills: 0 | Status: SHIPPED | OUTPATIENT
Start: 2021-01-11 | End: 2021-01-26 | Stop reason: SDUPTHER

## 2021-01-11 NOTE — TELEPHONE ENCOUNTER
patient requesting medication refill. Please approve or deny this request.    Rx requested:  Requested Prescriptions     Pending Prescriptions Disp Refills    SYNTHROID 125 MCG tablet 90 tablet 3     Sig: Take 1 tablet by mouth Daily    vitamin D (ERGOCALCIFEROL) 1.25 MG (59103 UT) CAPS capsule 12 capsule 3     Sig: Take 1 capsule by mouth once a week         Last Office Visit:   1/21/2020      Next Visit Date:  No future appointments.

## 2021-01-26 ENCOUNTER — OFFICE VISIT (OUTPATIENT)
Dept: ENDOCRINOLOGY | Age: 51
End: 2021-01-26
Payer: COMMERCIAL

## 2021-01-26 VITALS
SYSTOLIC BLOOD PRESSURE: 151 MMHG | DIASTOLIC BLOOD PRESSURE: 89 MMHG | HEART RATE: 72 BPM | OXYGEN SATURATION: 98 % | WEIGHT: 133 LBS | BODY MASS INDEX: 26.11 KG/M2 | HEIGHT: 60 IN

## 2021-01-26 DIAGNOSIS — E78.5 HYPERLIPIDEMIA, UNSPECIFIED HYPERLIPIDEMIA TYPE: ICD-10-CM

## 2021-01-26 DIAGNOSIS — E03.9 HYPOTHYROIDISM, UNSPECIFIED TYPE: ICD-10-CM

## 2021-01-26 DIAGNOSIS — E55.9 VITAMIN D DEFICIENCY: Primary | ICD-10-CM

## 2021-01-26 PROCEDURE — 99213 OFFICE O/P EST LOW 20 MIN: CPT | Performed by: INTERNAL MEDICINE

## 2021-01-26 RX ORDER — LEVOTHYROXINE SODIUM 125 MCG
125 TABLET ORAL DAILY
Qty: 90 TABLET | Refills: 3 | Status: SHIPPED | OUTPATIENT
Start: 2021-01-26 | End: 2021-03-01 | Stop reason: SDUPTHER

## 2021-01-26 NOTE — PROGRESS NOTES
Subjective:      Patient ID: Lamont Miles is a 45 y.o. female. 4-month follow-up on hypothyroidism history of hypercholesterolemia vitamin D deficiency patient on brand Synthroid 125 mcg daily thyroid function test have been stable  Other  This is a chronic (Hypothyroidism) problem. The current episode started more than 1 year ago. The problem has been waxing and waning. Treatments tried: Brand Synthroid. The treatment provided moderate relief. Lab Results   Component Value Date    CHOL 229 (H) 08/25/2020    CHOL 206 (H) 02/11/2020    CHOL 191 04/26/2018     Lab Results   Component Value Date    TRIG 83 08/25/2020    TRIG 82 02/11/2020    TRIG 62 04/26/2018     Lab Results   Component Value Date    HDL 68 (H) 08/25/2020    HDL 68 (H) 02/11/2020    HDL 65 (H) 04/26/2018     Lab Results   Component Value Date    LDLCALC 144 (H) 08/25/2020    LDLCALC 122 02/11/2020    LDLCALC 114 04/26/2018     Lab Results   Component Value Date    TSH 0.441 11/18/2015    G7FYXRO 0.92 11/26/2014    T4FREE 1.27 02/11/2020     Patient Active Problem List   Diagnosis    History of mammogram    Hypothyroidism    Raynaud's phenomenon without gangrene    IUD (intrauterine device) in place    Encounter for IUD removal and reinsertion    Irregular bleeding    IUD check up     Allergies   Allergen Reactions    Doxycycline        Current Outpatient Medications:     SYNTHROID 125 MCG tablet, Take 1 tablet by mouth Daily, Disp: 90 tablet, Rfl: 3    vitamin D (ERGOCALCIFEROL) 1.25 MG (09478 UT) CAPS capsule, Take 1 capsule by mouth once a week, Disp: 4 capsule, Rfl: 0    clindamycin-tretinoin (ZIANA) 1.2-0.025 % gel, APPLY AS DIRECTED, Disp: 180 g, Rfl: 0    calcium carbonate (OSCAL) 500 MG TABS tablet, Take 500 mg by mouth daily, Disp: , Rfl:     Omega-3 Fatty Acids (FISH OIL PO), Take  by mouth., Disp: , Rfl:     ferrous sulfate 325 (65 FE) MG tablet, Take 325 mg by mouth daily (with breakfast).   , Disp: , Rfl:   MULTIPLE VITAMINS PO, Take  by mouth.  , Disp: , Rfl:     levonorgestrel (MIRENA) 20 MCG/24HR IUD, 1 each by Intrauterine route once for 1 dose., Disp: 1 Intra Uterine Device, Rfl: 0      Review of Systems   Endocrine: Negative. All other systems reviewed and are negative. Vitals:    01/26/21 1117   BP: (!) 151/89   Site: Left Upper Arm   Position: Sitting   Cuff Size: Medium Adult   Pulse: 72   SpO2: 98%   Weight: 133 lb (60.3 kg)   Height: 5' (1.524 m)       Objective:   Physical Exam  Constitutional:       Appearance: Normal appearance. She is normal weight. HENT:      Head: Normocephalic and atraumatic. Right Ear: External ear normal.      Left Ear: External ear normal.      Nose: Nose normal.   Neck:      Musculoskeletal: Normal range of motion and neck supple. Cardiovascular:      Rate and Rhythm: Normal rate. Pulmonary:      Effort: Pulmonary effort is normal.   Musculoskeletal: Normal range of motion. Neurological:      General: No focal deficit present. Mental Status: She is alert and oriented to person, place, and time. Psychiatric:         Mood and Affect: Mood normal.         Behavior: Behavior normal.         Assessment:       Diagnosis Orders   1. Vitamin D deficiency  Vitamin D 25 Hydroxy   2. Hypothyroidism, unspecified type  T4, Free    TSH without Reflex    SYNTHROID 125 MCG tablet   3.  Hyperlipidemia, unspecified hyperlipidemia type  Basic Metabolic Panel    Lipid Panel           Plan:      Orders Placed This Encounter   Procedures    T4, Free     Standing Status:   Future     Standing Expiration Date:   1/26/2022    TSH without Reflex     Standing Status:   Future     Standing Expiration Date:   1/26/2022    Basic Metabolic Panel     Standing Status:   Future     Standing Expiration Date:   1/26/2022    Lipid Panel     Standing Status:   Future     Standing Expiration Date:   1/26/2022     Order Specific Question:   Is Patient Fasting?/# of Hours

## 2021-03-01 DIAGNOSIS — E03.9 HYPOTHYROIDISM, UNSPECIFIED TYPE: ICD-10-CM

## 2021-03-01 DIAGNOSIS — E55.9 VITAMIN D DEFICIENCY: ICD-10-CM

## 2021-03-01 RX ORDER — ERGOCALCIFEROL 1.25 MG/1
50000 CAPSULE ORAL WEEKLY
Qty: 4 CAPSULE | Refills: 0 | Status: SHIPPED | OUTPATIENT
Start: 2021-03-01 | End: 2021-06-28 | Stop reason: SDUPTHER

## 2021-03-01 RX ORDER — LEVOTHYROXINE SODIUM 125 MCG
125 TABLET ORAL DAILY
Qty: 90 TABLET | Refills: 3 | Status: SHIPPED | OUTPATIENT
Start: 2021-03-01 | End: 2021-06-28 | Stop reason: SDUPTHER

## 2021-03-01 NOTE — TELEPHONE ENCOUNTER
Patient  requesting medication refill.  Please approve or deny this request.    Rx requested:  Requested Prescriptions     Pending Prescriptions Disp Refills    SYNTHROID 125 MCG tablet 90 tablet 3     Sig: Take 1 tablet by mouth Daily    vitamin D (ERGOCALCIFEROL) 1.25 MG (12472 UT) CAPS capsule 4 capsule 0     Sig: Take 1 capsule by mouth once a week         Last Office Visit:   1/26/2021      Next Visit Date:  Future Appointments   Date Time Provider Shay Byrd   7/26/2021  9:15 AM Perla Guallpa MD [facility]

## 2021-06-28 DIAGNOSIS — E03.9 HYPOTHYROIDISM, UNSPECIFIED TYPE: ICD-10-CM

## 2021-06-28 DIAGNOSIS — E55.9 VITAMIN D DEFICIENCY: ICD-10-CM

## 2021-06-28 RX ORDER — LEVOTHYROXINE SODIUM 125 MCG
125 TABLET ORAL DAILY
Qty: 90 TABLET | Refills: 3 | Status: SHIPPED | OUTPATIENT
Start: 2021-06-28 | End: 2022-01-05 | Stop reason: SDUPTHER

## 2021-06-28 RX ORDER — ERGOCALCIFEROL 1.25 MG/1
50000 CAPSULE ORAL WEEKLY
Qty: 4 CAPSULE | Refills: 3 | Status: SHIPPED | OUTPATIENT
Start: 2021-06-28 | End: 2022-01-05 | Stop reason: SDUPTHER

## 2021-07-08 DIAGNOSIS — E55.9 VITAMIN D DEFICIENCY: ICD-10-CM

## 2021-07-08 DIAGNOSIS — E78.5 HYPERLIPIDEMIA, UNSPECIFIED HYPERLIPIDEMIA TYPE: ICD-10-CM

## 2021-07-08 DIAGNOSIS — E03.9 HYPOTHYROIDISM, UNSPECIFIED TYPE: ICD-10-CM

## 2021-07-08 LAB
ANION GAP SERPL CALCULATED.3IONS-SCNC: 10 MEQ/L (ref 9–15)
BUN BLDV-MCNC: 16 MG/DL (ref 6–20)
CALCIUM SERPL-MCNC: 9.9 MG/DL (ref 8.5–9.9)
CHLORIDE BLD-SCNC: 106 MEQ/L (ref 95–107)
CHOLESTEROL, TOTAL: 257 MG/DL (ref 0–199)
CO2: 26 MEQ/L (ref 20–31)
CREAT SERPL-MCNC: 0.48 MG/DL (ref 0.5–0.9)
GFR AFRICAN AMERICAN: >60
GFR NON-AFRICAN AMERICAN: >60
GLUCOSE BLD-MCNC: 90 MG/DL (ref 70–99)
HDLC SERPL-MCNC: 69 MG/DL (ref 40–59)
LDL CHOLESTEROL CALCULATED: 170 MG/DL (ref 0–129)
POTASSIUM SERPL-SCNC: 4.3 MEQ/L (ref 3.4–4.9)
SODIUM BLD-SCNC: 142 MEQ/L (ref 135–144)
T4 FREE: 1.09 NG/DL (ref 0.84–1.68)
TRIGL SERPL-MCNC: 92 MG/DL (ref 0–150)
TSH SERPL DL<=0.05 MIU/L-ACNC: 5.6 UIU/ML (ref 0.44–3.86)

## 2021-07-09 LAB — VITAMIN D 25-HYDROXY: 76.6 NG/ML (ref 30–100)

## 2021-07-29 ENCOUNTER — OFFICE VISIT (OUTPATIENT)
Dept: ENDOCRINOLOGY | Age: 51
End: 2021-07-29
Payer: COMMERCIAL

## 2021-07-29 VITALS
BODY MASS INDEX: 25.72 KG/M2 | WEIGHT: 131 LBS | HEART RATE: 62 BPM | OXYGEN SATURATION: 98 % | DIASTOLIC BLOOD PRESSURE: 78 MMHG | HEIGHT: 60 IN | SYSTOLIC BLOOD PRESSURE: 113 MMHG

## 2021-07-29 DIAGNOSIS — E78.5 HYPERLIPIDEMIA, UNSPECIFIED HYPERLIPIDEMIA TYPE: ICD-10-CM

## 2021-07-29 DIAGNOSIS — E03.9 HYPOTHYROIDISM, UNSPECIFIED TYPE: Primary | ICD-10-CM

## 2021-07-29 PROCEDURE — 99213 OFFICE O/P EST LOW 20 MIN: CPT | Performed by: INTERNAL MEDICINE

## 2021-07-29 RX ORDER — PITAVASTATIN CALCIUM 1.04 MG/1
1 TABLET, FILM COATED ORAL NIGHTLY
Qty: 30 TABLET | Refills: 1 | Status: SHIPPED | OUTPATIENT
Start: 2021-07-29 | End: 2022-07-09

## 2021-07-29 NOTE — PROGRESS NOTES
7/29/2021    Assessment:       Diagnosis Orders   1. Hypothyroidism, unspecified type  TSH without Reflex    T4, Free   2. Hyperlipidemia, unspecified hyperlipidemia type  Lipid Panel         PLAN:     Orders Placed This Encounter   Procedures    TSH without Reflex     Standing Status:   Future     Standing Expiration Date:   7/29/2022    T4, Free     Standing Status:   Future     Standing Expiration Date:   7/29/2022    Lipid Panel     Standing Status:   Future     Standing Expiration Date:   7/29/2022     Order Specific Question:   Is Patient Fasting?/# of Hours     Answer:   y     Start patient on Livalo 1 mg daily continue current dose of Synthroid 125 mcg daily  Orders Placed This Encounter   Medications    pitavastatin (LIVALO) 1 MG TABS tablet     Sig: Take 1 tablet by mouth nightly     Dispense:  30 tablet     Refill:  1       Subjective:     Chief Complaint   Patient presents with    Hypothyroidism     Vitals:    07/29/21 1550   BP: 113/78   Pulse: 62   SpO2: 98%   Weight: 131 lb (59.4 kg)   Height: 5' (1.524 m)     Wt Readings from Last 3 Encounters:   07/29/21 131 lb (59.4 kg)   01/26/21 133 lb (60.3 kg)   08/25/20 127 lb (57.6 kg)     BP Readings from Last 3 Encounters:   07/29/21 113/78   01/26/21 (!) 151/89   01/21/20 117/79     Follow-up on hypothyroidism had labs done recently which shows increase in cholesterol getting worse over the last couple of years thyroid function test show slightly elevated TSH vitamin D level was normal    Other  This is a chronic (Hypothyroidism) problem. The current episode started more than 1 year ago. The problem has been waxing and waning. Associated symptoms include fatigue. Treatments tried: Synthroid. The treatment provided moderate relief.      Past Medical History:   Diagnosis Date    History of mammogram 03/20/2012    History of ovarian cyst 2013    Hyperlipidemia LDL goal <130     Hypothyroidism     Dr Jennifer Ferrera    Leukopenia     constitutional    Raynaud phenomenon 2015     Past Surgical History:   Procedure Laterality Date    ABDOMINOPLASTY       Social History     Socioeconomic History    Marital status:      Spouse name: Not on file    Number of children: 3    Years of education: Not on file    Highest education level: Not on file   Occupational History    Occupation: homemaker, [employer]   Tobacco Use    Smoking status: Never Smoker    Smokeless tobacco: Never Used   Substance and Sexual Activity    Alcohol use: No    Drug use: No    Sexual activity: Not on file   Other Topics Concern    Not on file   Social History Narrative    Native of [facility]    Lives in a house in [place] with spouse (pulmonologist)     3 children in college    Works at [employer] Strain:     Difficulty of Paying Living Expenses:    Food Insecurity:     Worried About [address] in the Last Year:    [address] in the Last Year:    Transportation Needs:     Lack of Transportation (Medical):      Lack of Transportation (Non-Medical):    Physical Activity:     Days of Exercise per Week:     Minutes of Exercise per Session:    Stress:     Feeling of Stress :    Social Connections:     Frequency of Communication with Friends and Family:     Frequency of Social Gatherings with Friends and Family:     Attends Quaker Services:     Active Member of Clubs or Organizations:     Attends Club or Organization Meetings:     Marital Status:    Intimate Partner Violence:     Fear of Current or Ex-Partner:     Emotionally Abused:     Physically Abused:     Sexually Abused:      Family History   Problem Relation Age of Onset    High Cholesterol Father      Allergies   Allergen Reactions    Doxycycline        Current Outpatient Medications:     SYNTHROID 125 MCG tablet, Take 1 tablet by mouth Daily, Disp: 90 tablet, Rfl: 3    vitamin D (ERGOCALCIFEROL) 1.25 MG (66655 UT) CAPS capsule, Take 1 capsule by mouth once a week, Disp: 4 capsule, Rfl: 3    clindamycin-tretinoin (ZIANA) 1.2-0.025 % gel, APPLY AS DIRECTED, Disp: 180 g, Rfl: 0    calcium carbonate (OSCAL) 500 MG TABS tablet, Take 500 mg by mouth daily, Disp: , Rfl:     Omega-3 Fatty Acids (FISH OIL PO), Take  by mouth., Disp: , Rfl:     ferrous sulfate 325 (65 FE) MG tablet, Take 325 mg by mouth daily (with breakfast). , Disp: , Rfl:     MULTIPLE VITAMINS PO, Take  by mouth.  , Disp: , Rfl:     levonorgestrel (MIRENA) 20 MCG/24HR IUD, 1 each by Intrauterine route once for 1 dose., Disp: 1 Intra Uterine Device, Rfl: 0  Lab Results   Component Value Date     07/08/2021    K 4.3 07/08/2021     07/08/2021    CO2 26 07/08/2021    BUN 16 07/08/2021    CREATININE 0.48 (L) 07/08/2021    GLUCOSE 90 07/08/2021    CALCIUM 9.9 07/08/2021    PROT 7.0 09/08/2015    LABALBU 4.7 09/08/2015    BILITOT 0.6 09/08/2015    ALKPHOS 38 (L) 09/08/2015    AST 22 09/08/2015    ALT 18 09/08/2015    LABGLOM >60.0 07/08/2021    GFRAA >60.0 07/08/2021     Lab Results   Component Value Date    WBC 2.9 (L) 03/23/2018    HGB 14.0 03/23/2018    HCT 41.1 03/23/2018    MCV 97.8 03/23/2018     03/23/2018     Lab Results   Component Value Date    LABA1C 5.4 08/25/2020     Lab Results   Component Value Date    HDL 69 (H) 07/08/2021    HDL 68 (H) 08/25/2020    HDL 68 (H) 02/11/2020    LDLCALC 170 (H) 07/08/2021    LDLCALC 144 (H) 08/25/2020    LDLCALC 122 02/11/2020    CHOL 257 (H) 07/08/2021    CHOL 229 (H) 08/25/2020    CHOL 206 (H) 02/11/2020    TRIG 92 07/08/2021    TRIG 83 08/25/2020    TRIG 82 02/11/2020       Lab Results   Component Value Date    TSH 5.600 (H) 07/08/2021    TSH 0.441 11/18/2015    TSH 9.970 (H) 09/08/2015    TSHREFLEX 3.370 02/11/2020    T4FREE 1.09 07/08/2021    T4FREE 1.27 02/11/2020    T4FREE 1.57 04/05/2018       Review of Systems   Constitutional: Positive for fatigue. Cardiovascular: Negative. Endocrine: Negative.     All other systems reviewed and are negative. Objective:   Physical Exam  Vitals reviewed. Constitutional:       Appearance: Normal appearance. She is normal weight. HENT:      Head: Normocephalic and atraumatic. Hair is normal.      Right Ear: External ear normal.      Left Ear: External ear normal.      Nose: Nose normal.   Eyes:      General: No scleral icterus. Right eye: No discharge. Left eye: No discharge. Extraocular Movements: Extraocular movements intact. Conjunctiva/sclera: Conjunctivae normal.   Neck:      Trachea: Trachea normal.   Cardiovascular:      Rate and Rhythm: Normal rate. Musculoskeletal:         General: Normal range of motion. Cervical back: Normal range of motion and neck supple. Neurological:      General: No focal deficit present. Mental Status: She is alert and oriented to person, place, and time.    Psychiatric:         Mood and Affect: Mood normal.         Behavior: Behavior normal.

## 2021-07-30 ENCOUNTER — OFFICE VISIT (OUTPATIENT)
Dept: PRIMARY CARE CLINIC | Age: 51
End: 2021-07-30
Payer: COMMERCIAL

## 2021-07-30 VITALS
HEART RATE: 62 BPM | WEIGHT: 132 LBS | OXYGEN SATURATION: 97 % | HEIGHT: 60 IN | DIASTOLIC BLOOD PRESSURE: 62 MMHG | TEMPERATURE: 98.1 F | BODY MASS INDEX: 25.91 KG/M2 | SYSTOLIC BLOOD PRESSURE: 100 MMHG

## 2021-07-30 DIAGNOSIS — Z12.11 COLON CANCER SCREENING: ICD-10-CM

## 2021-07-30 DIAGNOSIS — Z12.31 ENCOUNTER FOR SCREENING MAMMOGRAM FOR MALIGNANT NEOPLASM OF BREAST: ICD-10-CM

## 2021-07-30 DIAGNOSIS — Z23 IMMUNIZATION DUE: ICD-10-CM

## 2021-07-30 DIAGNOSIS — Z00.00 PREVENTATIVE HEALTH CARE: Primary | ICD-10-CM

## 2021-07-30 PROCEDURE — 99396 PREV VISIT EST AGE 40-64: CPT | Performed by: INTERNAL MEDICINE

## 2021-07-30 RX ORDER — ZOSTER VACCINE RECOMBINANT, ADJUVANTED 50 MCG/0.5
0.5 KIT INTRAMUSCULAR SEE ADMIN INSTRUCTIONS
Qty: 0.5 ML | Refills: 1 | Status: SHIPPED | OUTPATIENT
Start: 2021-07-30 | End: 2022-01-26

## 2021-07-30 SDOH — ECONOMIC STABILITY: TRANSPORTATION INSECURITY
IN THE PAST 12 MONTHS, HAS THE LACK OF TRANSPORTATION KEPT YOU FROM MEDICAL APPOINTMENTS OR FROM GETTING MEDICATIONS?: NO

## 2021-07-30 SDOH — ECONOMIC STABILITY: FOOD INSECURITY: WITHIN THE PAST 12 MONTHS, YOU WORRIED THAT YOUR FOOD WOULD RUN OUT BEFORE YOU GOT MONEY TO BUY MORE.: NEVER TRUE

## 2021-07-30 SDOH — ECONOMIC STABILITY: TRANSPORTATION INSECURITY
IN THE PAST 12 MONTHS, HAS LACK OF TRANSPORTATION KEPT YOU FROM MEETINGS, WORK, OR FROM GETTING THINGS NEEDED FOR DAILY LIVING?: NO

## 2021-07-30 SDOH — ECONOMIC STABILITY: FOOD INSECURITY: WITHIN THE PAST 12 MONTHS, THE FOOD YOU BOUGHT JUST DIDN'T LAST AND YOU DIDN'T HAVE MONEY TO GET MORE.: NEVER TRUE

## 2021-07-30 ASSESSMENT — PATIENT HEALTH QUESTIONNAIRE - PHQ9
SUM OF ALL RESPONSES TO PHQ QUESTIONS 1-9: 0
SUM OF ALL RESPONSES TO PHQ9 QUESTIONS 1 & 2: 0
SUM OF ALL RESPONSES TO PHQ QUESTIONS 1-9: 0
2. FEELING DOWN, DEPRESSED OR HOPELESS: 0
SUM OF ALL RESPONSES TO PHQ QUESTIONS 1-9: 0
1. LITTLE INTEREST OR PLEASURE IN DOING THINGS: 0

## 2021-07-30 ASSESSMENT — ENCOUNTER SYMPTOMS
ABDOMINAL DISTENTION: 0
FACIAL SWELLING: 0
COUGH: 0
APNEA: 0
PHOTOPHOBIA: 0
BLOOD IN STOOL: 0

## 2021-07-30 ASSESSMENT — SOCIAL DETERMINANTS OF HEALTH (SDOH): HOW HARD IS IT FOR YOU TO PAY FOR THE VERY BASICS LIKE FOOD, HOUSING, MEDICAL CARE, AND HEATING?: NOT HARD AT ALL

## 2021-07-30 NOTE — PROGRESS NOTES
Ember Wilcox 45 y.o. female presents today with   Chief Complaint   Patient presents with   [address]     Be Well visit       HPI preventive, be well     Past Medical History:   Diagnosis Date    History of mammogram 03/20/2012    History of ovarian cyst 2013    Hyperlipidemia LDL goal <130     Hypothyroidism     Dr Josesito Novak    Leukopenia     constitutional    Raynaud phenomenon 2015     Patient Active Problem List    Diagnosis Date Noted    IUD check up 06/19/2018    Encounter for IUD removal and reinsertion 04/25/2018    Irregular bleeding 04/25/2018    IUD (intrauterine device) in place 04/04/2018    Raynaud's phenomenon without gangrene 10/20/2015    Hypothyroidism     History of mammogram      Past Surgical History:   Procedure Laterality Date    ABDOMINOPLASTY       Family History   Problem Relation Age of Onset    High Cholesterol Father      Social History     Socioeconomic History    Marital status:      Spouse name: None    Number of children: 3    Years of education: None    Highest education level: None   Occupational History    Occupation: homemaker, [email or web address]   Tobacco Use    Smoking status: Never Smoker    Smokeless tobacco: Never Used   Substance and Sexual Activity    Alcohol use: No    Drug use: No    Sexual activity: None   Other Topics Concern    None   Social History Narrative    Native of [facility]    Lives in a house in [place] with spouse (pulmonologist)     3 children in college    Works at [employer] Strain: Low Risk     Difficulty of Paying Living Expenses: Not hard at [address]: No Food Insecurity    Worried About [address] in the Last Year: Never true   [address] [facility] in the Last Year: Never true   Transportation Needs: No Transportation Needs    Lack of Transportation (Medical): No    Lack of Transportation (Non-Medical):  No   Physical Activity:     Days of Exercise per Week:     Minutes of Exercise per Session:    Stress:     Feeling of Stress :    Social Connections:     Frequency of Communication with Friends and Family:     Frequency of Social Gatherings with Friends and Family:     Attends Yazidi Services:     Active Member of Clubs or Organizations:     Attends Club or Organization Meetings:     Marital Status:    Intimate Partner Violence:     Fear of Current or Ex-Partner:     Emotionally Abused:     Physically Abused:     Sexually Abused: Allergies   Allergen Reactions    Doxycycline        Review of Systems   Constitutional: Positive for chills. Negative for fever. HENT: Negative for facial swelling and nosebleeds. Eyes: Negative for photophobia and visual disturbance. Respiratory: Negative for apnea and cough. Cardiovascular: Negative for chest pain and palpitations. Gastrointestinal: Negative for abdominal distention and blood in stool. Genitourinary: Negative for enuresis and hematuria. Musculoskeletal: Negative for gait problem and joint swelling. Skin: Negative for rash. Neurological: Negative for syncope and speech difficulty. Hematological: Does not bruise/bleed easily. Psychiatric/Behavioral: Negative for agitation, hallucinations and suicidal ideas. Vitals:    07/30/21 1002   BP: 100/62   Pulse: 62   Temp: 98.1 °F (36.7 °C)   SpO2: 97%   Weight: 132 lb (59.9 kg)   Height: 5' (1.524 m)       Physical Exam  Constitutional:       Appearance: She is well-developed. HENT:      Head: Normocephalic and atraumatic. Eyes:      Pupils: Pupils are equal, round, and reactive to light. Cardiovascular:      Rate and Rhythm: Normal rate and regular rhythm. Heart sounds: Normal heart sounds. Pulmonary:      Effort: No respiratory distress. Breath sounds: Normal breath sounds. No wheezing. Abdominal:      General: There is no distension. Tenderness: There is no abdominal tenderness. Musculoskeletal:         General: Normal range of motion. Cervical back: Normal range of motion. Skin:     Coloration: Skin is not jaundiced. Neurological:      Mental Status: She is alert and oriented to person, place, and time. Cranial Nerves: No cranial nerve deficit. Psychiatric:         Mood and Affect: Mood normal.         waist line 34 inches  Lab on 7/8 glucose 90, total chol 257, trig 92, HDL 69,   Assessment/Plan  Jazmin was seen today for establish care. Diagnoses and all orders for this visit:    Preventative health care    Encounter for screening mammogram for malignant neoplasm of breast  -     PAGE DIGITAL SCREEN UNILATERAL LEFT; Future    Immunization due  -     zoster recombinant adjuvanted vaccine [facility]) 50 MCG/0.5ML SUSR injection; Inject 0.5 mLs into the muscle See Admin Instructions 1 dose now and repeat in 2-6 months    Colon cancer screening  -     COLONOSCOPY (Screening); Future        Return in about 1 year (around 7/30/2022), or if symptoms worsen or fail to improve.     Jerilyn Monterroso MD

## 2021-10-08 ENCOUNTER — HOSPITAL ENCOUNTER (OUTPATIENT)
Dept: WOMENS IMAGING | Age: 51
Discharge: HOME | End: 2021-10-10
Payer: COMMERCIAL

## 2021-10-08 DIAGNOSIS — Z12.31 ENCOUNTER FOR SCREENING MAMMOGRAM FOR MALIGNANT NEOPLASM OF BREAST: ICD-10-CM

## 2021-10-08 DIAGNOSIS — Z12.31 ENCOUNTER FOR SCREENING MAMMOGRAM FOR BREAST CANCER: ICD-10-CM

## 2021-10-08 PROCEDURE — 77063 BREAST TOMOSYNTHESIS BI: CPT

## 2022-01-05 DIAGNOSIS — E03.9 HYPOTHYROIDISM, UNSPECIFIED TYPE: ICD-10-CM

## 2022-01-05 DIAGNOSIS — E55.9 VITAMIN D DEFICIENCY: ICD-10-CM

## 2022-01-05 RX ORDER — LEVOTHYROXINE SODIUM 125 MCG
125 TABLET ORAL DAILY
Qty: 90 TABLET | Refills: 3 | Status: SHIPPED | OUTPATIENT
Start: 2022-01-05 | End: 2022-07-09

## 2022-01-05 RX ORDER — ERGOCALCIFEROL 1.25 MG/1
50000 CAPSULE ORAL WEEKLY
Qty: 12 CAPSULE | Refills: 3 | Status: SHIPPED | OUTPATIENT
Start: 2022-01-05 | End: 2022-07-09 | Stop reason: SDUPTHER

## 2022-01-08 ENCOUNTER — OFFICE VISIT (OUTPATIENT)
Dept: ENDOCRINOLOGY | Age: 52
End: 2022-01-08

## 2022-01-08 VITALS
BODY MASS INDEX: 25.52 KG/M2 | HEART RATE: 78 BPM | DIASTOLIC BLOOD PRESSURE: 76 MMHG | SYSTOLIC BLOOD PRESSURE: 128 MMHG | HEIGHT: 60 IN | WEIGHT: 130 LBS | OXYGEN SATURATION: 98 %

## 2022-01-08 DIAGNOSIS — E03.9 HYPOTHYROIDISM, UNSPECIFIED TYPE: Primary | ICD-10-CM

## 2022-01-08 DIAGNOSIS — E55.9 VITAMIN D DEFICIENCY: ICD-10-CM

## 2022-01-08 DIAGNOSIS — E78.5 HYPERLIPIDEMIA, UNSPECIFIED HYPERLIPIDEMIA TYPE: ICD-10-CM

## 2022-01-08 PROCEDURE — 99213 OFFICE O/P EST LOW 20 MIN: CPT | Performed by: INTERNAL MEDICINE

## 2022-01-08 NOTE — PROGRESS NOTES
1/8/2022    Assessment:       Diagnosis Orders   1. Hypothyroidism, unspecified type  T4, Free    TSH without Reflex   2. Hyperlipidemia, unspecified hyperlipidemia type  Lipid Panel    Basic Metabolic Panel   3. Vitamin D deficiency  Vitamin D 25 Hydroxy         PLAN:     Continue Synthroid at current dose patient to have lipid panel BMP thyroid function test follow-up in 3 months    Orders Placed This Encounter   Procedures    T4, Free     Standing Status:   Future     Standing Expiration Date:   1/8/2023    TSH without Reflex     Standing Status:   Future     Standing Expiration Date:   1/8/2023    Lipid Panel     Standing Status:   Future     Standing Expiration Date:   1/8/2023     Order Specific Question:   Is Patient Fasting?/# of Hours     Answer:   y    Basic Metabolic Panel     Standing Status:   Future     Standing Expiration Date:   1/8/2023    Vitamin D 25 Hydroxy     Standing Status:   Future     Standing Expiration Date:   1/8/2023       Subjective:     Chief Complaint   Patient presents with    Hypothyroidism     Vitals:    01/08/22 1223   BP: 128/76   Pulse: 78   SpO2: 98%   Weight: 130 lb (59 kg)   Height: 5' (1.524 m)     Wt Readings from Last 3 Encounters:   01/08/22 130 lb (59 kg)   07/30/21 132 lb (59.9 kg)   07/29/21 131 lb (59.4 kg)     BP Readings from Last 3 Encounters:   01/08/22 128/76   07/30/21 100/62   07/29/21 113/78     Follow-up on hypothyroidism patient on replacement with Synthroid thyroid function test reviewed from 6 months ago stable history of vitamin D deficiency on 50,000 units once a week  History of hyperlipidemia patient not on lipid meds at this time    Hyperlipidemia  This is a recurrent problem. The current episode started more than 1 year ago. The problem is uncontrolled. Recent lipid tests were reviewed and are variable. Exacerbating diseases include hypothyroidism. Current antihyperlipidemic treatment includes diet change.      Past Medical History:   Diagnosis Date    History of mammogram 03/20/2012    History of ovarian cyst 2013    Hyperlipidemia LDL goal <130     Hypothyroidism     Dr Nisreen Bonilla    Leukopenia     constitutional    Raynaud phenomenon 2015     Past Surgical History:   Procedure Laterality Date    ABDOMINOPLASTY       Social History     Socioeconomic History    Marital status:      Spouse name: Not on file    Number of children: 3    Years of education: Not on file    Highest education level: Not on file   Occupational History    Occupation: homemaker, [employer]   Tobacco Use    Smoking status: Never Smoker    Smokeless tobacco: Never Used   Substance and Sexual Activity    Alcohol use: No    Drug use: No    Sexual activity: Not on file   Other Topics Concern    Not on file   Social History Narrative    Native of [facility]    Lives in a house in [place] with spouse (pulmonologist)     3 children in college    Works at [employer] Strain: Low Risk     Difficulty of Paying Living Expenses: Not hard at [address]: No Food Insecurity    Worried About [address] in the Last Year: Never true   [address] in the Last Year: Never true   Transportation Needs: No Transportation Needs    Lack of Transportation (Medical): No    Lack of Transportation (Non-Medical):  No   Physical Activity:     Days of Exercise per Week: Not on file    Minutes of Exercise per Session: Not on file   Stress:     Feeling of Stress : Not on file   Social Connections:     Frequency of Communication with Friends and Family: Not on file    Frequency of Social Gatherings with Friends and Family: Not on file    Attends Tenriism Services: Not on file    Active Member of Clubs or Organizations: Not on file    Attends Club or Organization Meetings: Not on file    Marital Status: Not on file   Intimate Partner Violence:     Fear of Current or Ex-Partner: Not on file   [employer] Abused: Not on file    Physically Abused: Not on file    Sexually Abused: Not on file   Housing Stability:     Unable to Pay for Housing in the Last Year: Not on file    Number of Places Lived in the Last Year: Not on file    Unstable Housing in the Last Year: Not on file     Family History   Problem Relation Age of Onset    High Cholesterol Father     Breast Cancer Maternal Aunt      Allergies   Allergen Reactions    Doxycycline        Current Outpatient Medications:     SYNTHROID 125 MCG tablet, Take 1 tablet by mouth Daily, Disp: 90 tablet, Rfl: 3    vitamin D (ERGOCALCIFEROL) 1.25 MG (22122 UT) CAPS capsule, Take 1 capsule by mouth once a week, Disp: 12 capsule, Rfl: 3    rosuvastatin (CRESTOR) 10 MG tablet, Take 1 tablet by mouth nightly, Disp: 90 tablet, Rfl: 3    pitavastatin (LIVALO) 1 MG TABS tablet, Take 1 tablet by mouth nightly, Disp: 30 tablet, Rfl: 1    clindamycin-tretinoin (ZIANA) 1.2-0.025 % gel, APPLY AS DIRECTED, Disp: 180 g, Rfl: 0    calcium carbonate (OSCAL) 500 MG TABS tablet, Take 500 mg by mouth daily, Disp: , Rfl:     Omega-3 Fatty Acids (FISH OIL PO), Take  by mouth., Disp: , Rfl:     ferrous sulfate 325 (65 FE) MG tablet, Take 325 mg by mouth daily (with breakfast).   , Disp: , Rfl:     MULTIPLE VITAMINS PO, Take  by mouth.  , Disp: , Rfl:     zoster recombinant adjuvanted vaccine [facility]) 50 MCG/0.5ML SUSR injection, Inject 0.5 mLs into the muscle See Admin Instructions 1 dose now and repeat in 2-6 months (Patient not taking: Reported on 1/8/2022), Disp: 0.5 mL, Rfl: 1    levonorgestrel (MIRENA) 20 MCG/24HR IUD, 1 each by Intrauterine route once for 1 dose., Disp: 1 Intra Uterine Device, Rfl: 0  Lab Results   Component Value Date     07/08/2021    K 4.3 07/08/2021     07/08/2021    CO2 26 07/08/2021    BUN 16 07/08/2021    CREATININE 0.48 (L) 07/08/2021    GLUCOSE 90 07/08/2021    CALCIUM 9.9 07/08/2021    PROT 7.0 09/08/2015    LABALBU 4.7 09/08/2015 BILITOT 0.6 09/08/2015    ALKPHOS 38 (L) 09/08/2015    AST 22 09/08/2015    ALT 18 09/08/2015    LABGLOM >60.0 07/08/2021    GFRAA >60.0 07/08/2021     Lab Results   Component Value Date    WBC 2.9 (L) 03/23/2018    HGB 14.0 03/23/2018    HCT 41.1 03/23/2018    MCV 97.8 03/23/2018     03/23/2018     Lab Results   Component Value Date    LABA1C 5.4 08/25/2020     Lab Results   Component Value Date    HDL 69 (H) 07/08/2021    HDL 68 (H) 08/25/2020    HDL 68 (H) 02/11/2020    LDLCALC 170 (H) 07/08/2021    LDLCALC 144 (H) 08/25/2020    LDLCALC 122 02/11/2020    CHOL 257 (H) 07/08/2021    CHOL 229 (H) 08/25/2020    CHOL 206 (H) 02/11/2020    TRIG 92 07/08/2021    TRIG 83 08/25/2020    TRIG 82 02/11/2020     No results found for: TESTM  Lab Results   Component Value Date    TSH 5.600 (H) 07/08/2021    TSH 0.441 11/18/2015    TSH 9.970 (H) 09/08/2015    TSHREFLEX 3.370 02/11/2020    T4FREE 1.09 07/08/2021    T4FREE 1.27 02/11/2020    T4FREE 1.57 04/05/2018     No results found for: TPOABS     Review of Systems   Cardiovascular: Negative. Endocrine: Negative. All other systems reviewed and are negative. Objective:   Physical Exam  Vitals reviewed. Constitutional:       Appearance: Normal appearance. She is normal weight. HENT:      Head: Normocephalic and atraumatic. Hair is normal.      Right Ear: External ear normal.      Left Ear: External ear normal.      Nose: Nose normal.   Eyes:      General: No scleral icterus. Right eye: No discharge. Left eye: No discharge. Extraocular Movements: Extraocular movements intact. Conjunctiva/sclera: Conjunctivae normal.   Neck:      Trachea: Trachea normal.   Cardiovascular:      Rate and Rhythm: Normal rate. Pulmonary:      Effort: Pulmonary effort is normal.   Musculoskeletal:         General: Normal range of motion. Cervical back: Normal range of motion and neck supple. Neurological:      General: No focal deficit present. Mental Status: She is alert and oriented to person, place, and time.    Psychiatric:         Mood and Affect: Mood normal.         Behavior: Behavior normal.

## 2022-03-22 DIAGNOSIS — E78.5 HYPERLIPIDEMIA, UNSPECIFIED HYPERLIPIDEMIA TYPE: ICD-10-CM

## 2022-03-22 DIAGNOSIS — E03.9 HYPOTHYROIDISM, UNSPECIFIED TYPE: ICD-10-CM

## 2022-03-22 DIAGNOSIS — E55.9 VITAMIN D DEFICIENCY: ICD-10-CM

## 2022-03-22 LAB
ANION GAP SERPL CALCULATED.3IONS-SCNC: 13 MEQ/L (ref 9–15)
BUN BLDV-MCNC: 13 MG/DL (ref 6–20)
CALCIUM SERPL-MCNC: 9.3 MG/DL (ref 8.5–9.9)
CHLORIDE BLD-SCNC: 103 MEQ/L (ref 95–107)
CHOLESTEROL, TOTAL: 222 MG/DL (ref 0–199)
CO2: 24 MEQ/L (ref 20–31)
CREAT SERPL-MCNC: 0.43 MG/DL (ref 0.5–0.9)
GFR AFRICAN AMERICAN: >60
GFR NON-AFRICAN AMERICAN: >60
GLUCOSE BLD-MCNC: 86 MG/DL (ref 70–99)
HDLC SERPL-MCNC: 59 MG/DL (ref 40–59)
LDL CHOLESTEROL CALCULATED: 138 MG/DL (ref 0–129)
POTASSIUM SERPL-SCNC: 4.6 MEQ/L (ref 3.4–4.9)
SODIUM BLD-SCNC: 140 MEQ/L (ref 135–144)
T4 FREE: 1.69 NG/DL (ref 0.84–1.68)
TRIGL SERPL-MCNC: 124 MG/DL (ref 0–150)
TSH SERPL DL<=0.05 MIU/L-ACNC: 0.01 UIU/ML (ref 0.44–3.86)

## 2022-03-23 LAB — VITAMIN D 25-HYDROXY: 78.3 NG/ML

## 2022-03-28 RX ORDER — LEVOTHYROXINE SODIUM 100 MCG
100 TABLET ORAL DAILY
Qty: 90 TABLET | Refills: 3 | Status: SHIPPED | OUTPATIENT
Start: 2022-03-28 | End: 2022-07-12 | Stop reason: SDUPTHER

## 2022-05-24 ENCOUNTER — TELEPHONE (OUTPATIENT)
Dept: GASTROENTEROLOGY | Age: 52
End: 2022-05-24

## 2022-06-23 ENCOUNTER — COMMUNITY OUTREACH (OUTPATIENT)
Dept: PRIMARY CARE CLINIC | Age: 52
End: 2022-06-23

## 2022-06-23 NOTE — PROGRESS NOTES
Patient's HM shows they are overdue for Colonoscopy. [employer] and  files searched  without success.

## 2022-07-09 ENCOUNTER — OFFICE VISIT (OUTPATIENT)
Dept: ENDOCRINOLOGY | Age: 52
End: 2022-07-09
Payer: COMMERCIAL

## 2022-07-09 VITALS — WEIGHT: 132 LBS | BODY MASS INDEX: 25.91 KG/M2 | HEIGHT: 60 IN

## 2022-07-09 DIAGNOSIS — E55.9 VITAMIN D DEFICIENCY: ICD-10-CM

## 2022-07-09 DIAGNOSIS — E78.5 HYPERLIPIDEMIA, UNSPECIFIED HYPERLIPIDEMIA TYPE: ICD-10-CM

## 2022-07-09 DIAGNOSIS — E03.9 HYPOTHYROIDISM, UNSPECIFIED TYPE: Primary | ICD-10-CM

## 2022-07-09 PROCEDURE — 99213 OFFICE O/P EST LOW 20 MIN: CPT | Performed by: INTERNAL MEDICINE

## 2022-07-09 RX ORDER — ERGOCALCIFEROL 1.25 MG/1
50000 CAPSULE ORAL WEEKLY
Qty: 12 CAPSULE | Refills: 3 | Status: SHIPPED | OUTPATIENT
Start: 2022-07-09

## 2022-07-09 RX ORDER — ROSUVASTATIN CALCIUM 10 MG/1
10 TABLET, COATED ORAL NIGHTLY
Qty: 90 TABLET | Refills: 3 | Status: SHIPPED | OUTPATIENT
Start: 2022-07-09

## 2022-07-09 NOTE — PROGRESS NOTES
7/9/2022    Assessment:       Diagnosis Orders   1. Hypothyroidism, unspecified type  T4, Free    TSH with Reflex    DEXA BONE DENSITY AXIAL SKELETON   2. Vitamin D deficiency  vitamin D (ERGOCALCIFEROL) 1.25 MG (98890 UT) CAPS capsule   3. Hyperlipidemia, unspecified hyperlipidemia type  Lipid Panel         PLAN:     Orders Placed This Encounter   Medications    rosuvastatin (CRESTOR) 10 MG tablet     Sig: Take 1 tablet by mouth nightly     Dispense:  90 tablet     Refill:  3    vitamin D (ERGOCALCIFEROL) 1.25 MG (44979 UT) CAPS capsule     Sig: Take 1 capsule by mouth once a week     Dispense:  12 capsule     Refill:  3     Continue current dose of Synthroid 100 mcg daily  Subjective:     Chief Complaint   Patient presents with    Hypothyroidism     Vitals:    07/09/22 1012   Weight: 132 lb (59.9 kg)   Height: 5' (1.524 m)     Wt Readings from Last 3 Encounters:   07/09/22 132 lb (59.9 kg)   01/08/22 130 lb (59 kg)   07/30/21 132 lb (59.9 kg)     BP Readings from Last 3 Encounters:   01/08/22 128/76   07/30/21 100/62   07/29/21 113/78     Follow-up on hypothyroidism hyperlipidemia last TSH was suppressed Synthroid dose lowered from 112 to  100 mcg daily  Patient also has vitamin D deficiency vitamin D level has been stable    Hyperlipidemia  This is a chronic problem. The current episode started more than 1 year ago. The problem is uncontrolled. Exacerbating diseases include hypothyroidism. Current antihyperlipidemic treatment includes statins. Other  This is a chronic (Hypothyroidism) problem. The current episode started more than 1 year ago.      Past Medical History:   Diagnosis Date    History of mammogram 03/20/2012    History of ovarian cyst 2013    Hyperlipidemia LDL goal <130     Hypothyroidism     Dr Vivienne Correia    Leukopenia     constitutional    Raynaud phenomenon 2015     Past Surgical History:   Procedure Laterality Date    ABDOMINOPLASTY       Social History     Socioeconomic History    Marital status:      Spouse name: Not on file    Number of children: 3    Years of education: Not on file    Highest education level: Not on file   Occupational History    Occupation: homemaker, [employer]   Tobacco Use    Smoking status: Never Smoker    Smokeless tobacco: Never Used   Substance and Sexual Activity    Alcohol use: No    Drug use: No    Sexual activity: Not on file   Other Topics Concern    Not on file   Social History Narrative    Native of [place]    Lives in a house in [place] with spouse (pulmonologist)     3 children in college    Works at [employer] Strain: Low Risk     Difficulty of Paying Living Expenses: Not hard at [address]: No Food Insecurity    Worried About [address] in the Last Year: Never true   [address] in the Last Year: Never true   Transportation Needs: No Transportation Needs    Lack of Transportation (Medical): No    Lack of Transportation (Non-Medical):  No   Physical Activity:     Days of Exercise per Week: Not on file    Minutes of Exercise per Session: Not on file   Stress:     Feeling of Stress : Not on file   Social Connections:     Frequency of Communication with Friends and Family: Not on file    Frequency of Social Gatherings with Friends and Family: Not on file    Attends Restoration Services: Not on file    Active Member of [address] [place] [address] "[employer] or Organizations: Not on file    Attends Club or Organization Meetings: Not on file    Marital Status: Not on file   Intimate Partner Violence:     Fear of Current or Ex-Partner: Not on file    Emotionally Abused: Not on file    Physically Abused: Not on file    Sexually Abused: Not on file   Housing Stability:     Unable to Pay for Housing in the Last Year: Not on file    Number of [place] in the Last Year: Not on file    Unstable Housing in the Last Year: Not on file     Family History   Problem Relation Age of Onset    High Cholesterol Father     Breast Cancer Maternal Aunt      Allergies   Allergen Reactions    Doxycycline        Current Outpatient Medications:     SYNTHROID 100 MCG tablet, Take 1 tablet by mouth Daily, Disp: 90 tablet, Rfl: 3    SYNTHROID 125 MCG tablet, Take 1 tablet by mouth Daily, Disp: 90 tablet, Rfl: 3    vitamin D (ERGOCALCIFEROL) 1.25 MG (36432 UT) CAPS capsule, Take 1 capsule by mouth once a week, Disp: 12 capsule, Rfl: 3    rosuvastatin (CRESTOR) 10 MG tablet, Take 1 tablet by mouth nightly, Disp: 90 tablet, Rfl: 3    pitavastatin (LIVALO) 1 MG TABS tablet, Take 1 tablet by mouth nightly, Disp: 30 tablet, Rfl: 1    clindamycin-tretinoin (ZIANA) 1.2-0.025 % gel, APPLY AS DIRECTED, Disp: 180 g, Rfl: 0    calcium carbonate (OSCAL) 500 MG TABS tablet, Take 500 mg by mouth daily, Disp: , Rfl:     Omega-3 Fatty Acids (FISH OIL PO), Take  by mouth., Disp: , Rfl:     ferrous sulfate 325 (65 FE) MG tablet, Take 325 mg by mouth daily (with breakfast).   , Disp: , Rfl:     MULTIPLE VITAMINS PO, Take  by mouth.  , Disp: , Rfl:     levonorgestrel (MIRENA) 20 MCG/24HR IUD, 1 each by Intrauterine route once for 1 dose., Disp: 1 Intra Uterine Device, Rfl: 0  Lab Results   Component Value Date     03/22/2022    K 4.6 03/22/2022     03/22/2022    CO2 24 03/22/2022    BUN 13 03/22/2022    CREATININE 0.43 (L) 03/22/2022    GLUCOSE 86 03/22/2022    CALCIUM 9.3 03/22/2022    PROT 7.0 09/08/2015    LABALBU 4.7 09/08/2015    BILITOT 0.6 09/08/2015    ALKPHOS 38 (L) 09/08/2015    AST 22 09/08/2015    ALT 18 09/08/2015    LABGLOM >60.0 03/22/2022    GFRAA >60.0 03/22/2022     Lab Results   Component Value Date    WBC 2.9 (L) 03/23/2018    HGB 14.0 03/23/2018    HCT 41.1 03/23/2018    MCV 97.8 03/23/2018     03/23/2018     Lab Results   Component Value Date    LABA1C 5.4 08/25/2020     Lab Results   Component Value Date    HDL 59 03/22/2022    HDL 69 (H) 07/08/2021    HDL 68 (H) 08/25/2020    LDLCALC 138 (H) 03/22/2022    LDLCALC 170 (H) 07/08/2021    LDLCALC 144 (H) 08/25/2020    CHOL 222 (H) 03/22/2022    CHOL 257 (H) 07/08/2021    CHOL 229 (H) 08/25/2020    TRIG 124 03/22/2022    TRIG 92 07/08/2021    TRIG 83 08/25/2020     No results found for: TESTM  Lab Results   Component Value Date    TSH 0.015 (L) 03/22/2022    TSH 5.600 (H) 07/08/2021    TSH 0.441 11/18/2015    TSHREFLEX 3.370 02/11/2020    T4FREE 1.69 (H) 03/22/2022    T4FREE 1.09 07/08/2021    T4FREE 1.27 02/11/2020     No results found for: TPOABS    Review of Systems   Cardiovascular: Negative. Endocrine: Negative. Psychiatric/Behavioral: Negative. All other systems reviewed and are negative. Objective:   Physical Exam  Vitals reviewed. Constitutional:       General: She is not in acute distress. Appearance: Normal appearance. HENT:      Head: Normocephalic and atraumatic. Right Ear: External ear normal.      Left Ear: External ear normal.      Nose: Nose normal.   Eyes:      General: No scleral icterus. Right eye: No discharge. Left eye: No discharge. Extraocular Movements: Extraocular movements intact. Conjunctiva/sclera: Conjunctivae normal.   Cardiovascular:      Rate and Rhythm: Normal rate. Pulmonary:      Effort: Pulmonary effort is normal.   Musculoskeletal:         General: Normal range of motion. Cervical back: Normal range of motion and neck supple. Neurological:      General: No focal deficit present. Mental Status: She is alert and oriented to person, place, and time.    Psychiatric:         Mood and Affect: Mood normal.         Behavior: Behavior normal.

## 2022-07-12 ENCOUNTER — TELEPHONE (OUTPATIENT)
Dept: GASTROENTEROLOGY | Age: 52
End: 2022-07-12

## 2022-07-12 RX ORDER — LEVOTHYROXINE SODIUM 100 MCG
100 TABLET ORAL DAILY
Qty: 90 TABLET | Refills: 3 | Status: SHIPPED | OUTPATIENT
Start: 2022-07-12

## 2022-07-12 NOTE — TELEPHONE ENCOUNTER
Patient requesting medication refill.  Please approve or deny this request.    Rx requested:  Requested Prescriptions     Pending Prescriptions Disp Refills    SYNTHROID 100 MCG tablet 90 tablet 3     Sig: Take 1 tablet by mouth Daily         Last Office Visit:   7/9/2022      Next Visit Date:  Future Appointments   Date Time Provider Shay Byrd   10/8/2022 10:30 AM Martha Willingham MD [place] [id number]

## 2022-12-06 ENCOUNTER — OFFICE VISIT (OUTPATIENT)
Dept: OBGYN CLINIC | Age: 52
End: 2022-12-06
Payer: COMMERCIAL

## 2022-12-06 VITALS
SYSTOLIC BLOOD PRESSURE: 122 MMHG | WEIGHT: 136 LBS | DIASTOLIC BLOOD PRESSURE: 86 MMHG | HEIGHT: 60 IN | BODY MASS INDEX: 26.7 KG/M2 | HEART RATE: 60 BPM

## 2022-12-06 DIAGNOSIS — Z11.51 SCREENING FOR HPV (HUMAN PAPILLOMAVIRUS): ICD-10-CM

## 2022-12-06 DIAGNOSIS — Z01.419 VISIT FOR GYNECOLOGIC EXAMINATION: Primary | ICD-10-CM

## 2022-12-06 DIAGNOSIS — Z12.31 VISIT FOR SCREENING MAMMOGRAM: ICD-10-CM

## 2022-12-06 DIAGNOSIS — Z30.431 IUD CHECK UP: ICD-10-CM

## 2022-12-06 DIAGNOSIS — Z01.419 VISIT FOR GYNECOLOGIC EXAMINATION: ICD-10-CM

## 2022-12-06 PROCEDURE — 99396 PREV VISIT EST AGE 40-64: CPT | Performed by: OBSTETRICS & GYNECOLOGY

## 2022-12-06 RX ORDER — VITAMIN E (DL,TOCOPHERYL ACET) 180 MG
CAPSULE ORAL
COMMUNITY

## 2022-12-06 NOTE — PROGRESS NOTES
Postmenopausal Annual    Subjective:     Leonidas Galvan is a 47y.o. year old female    female who presents today for her annual well woman exam.  The patient is sexuallyactive. The patient is not taking hormone replacement therapy. Patient denies post-menopausal vaginal bleeding. The patient has regular exercise: no  Patient with history of Mirena IUD, experiencing more discharge and vaginal spotting recently, Mirena has been in for almost 5 years since.      Vitals:  /86   Pulse 60   Ht 5' (1.524 m)   Wt 136 lb (61.7 kg)   BMI 26.56 kg/m²   Allergies:  Doxycycline  Past Medical History:   Diagnosis Date    History of mammogram 2012    History of ovarian cyst 2013    Hyperlipidemia LDL goal <130     Hypothyroidism     Dr Antonio Oconnell    Leukopenia     constitutional    Raynaud phenomenon      Past Surgical History:   Procedure Laterality Date    ABDOMINOPLASTY       Family History   Problem Relation Age of Onset    High Cholesterol Father     Breast Cancer Maternal Aunt      Social History     Socioeconomic History    Marital status:      Spouse name: Not on file    Number of children: 3    Years of education: Not on file    Highest education level: Not on file   Occupational History    Occupation: homemaker, [employer]   Tobacco Use    Smoking status: Never    Smokeless tobacco: Never   Substance and Sexual Activity    Alcohol use: No    Drug use: No    Sexual activity: Not on file   Other Topics Concern    Not on file   Social History Narrative    Native of [facility]    Lives in a house in [place] with spouse (pulmonologist)     3 children in college    Works at [employer] Strain: Not on [employer] Insecurity: Not on file   Transportation Needs: Not on file   Physical Activity: Not on file   Stress: Not on file   Social Connections: Not on file   Intimate Partner Violence: Not on file   Housing Stability: Not on file Last mammogram 2 yrs - normal   Breast cancer risk factors : denies       Last Pap 4 yrs - wnl     No LMP recorded. (Menstrual status: IUD). Age at menopause onset: unknown   Menopausal symptom assessment:  Vasomotor symptoms: denies   Urinary incontinence &  symptoms: denies   Sexual dysfunction: denies   Present Hormonalmedications: denies     Osteoporosis risk assessment : negative   Last bone mineral density : not indicated     History of abnormal lipids: yes - crestor   Hypertension no  Stroke/Edmund    Yearly flu vaccine recommended for persons aged 48 and older. Colonoscopy to be done , up to date. Review of Systems  Review of Systems  All other systems reviewed and are negative. Review of Systems   Constitutional: Negative for chills and fever. Respiratory: Negative for cough and shortness of breath. Cardiovascular: Negative for chest painand palpitations. Gastrointestinal: Negative for nausea and vomiting. Genitourinary: Negative for dysuria, frequency and urgency. Musculoskeletal: Negative formyalgias. Neurological: Negative for dizziness, seizures and headaches. Psychiatric/Behavioral: Negative for depression and suicidal ideas.     :     Vitals:  /86   Pulse 60   Ht 5' (1.524 m)   Wt 136 lb (61.7 kg)   BMI 26.56 kg/m²     Physical Exam  Physical Exam    Constitutional: She is oriented to person, place, and time and well-developed, well-nourished, and in nodistress. HENT:   Head: Normocephalic and atraumatic. Eyes: Conjunctivae are normal. Pupils are equal, round, and reactive to light. Neck: Normal range ofmotion. Neck supple. Cardiovascular: Normal rate and regular rhythm. Pulmonary/Chest: Effort normal and breath sounds normal. No respiratory distress. Right breast exhibits noinverted nipple, no mass, no nipple discharge, no skin change and no tenderness.  Left breast exhibits no inverted nipple, no mass, no nipple discharge, no skin change and no tenderness. Breasts are symmetrical.   Abdominal: Soft. Bowel sounds are normal.   Genitourinary: Vagina normal, uterus normal and cervix normal. No vaginal discharge found. IUD string seen at cervical os confirming IUD is in place. Musculoskeletal: Normal range ofmotion. Neurological: She is alert and oriented to person, place, and time. She has normal reflexes. Psychiatric: Memory, affect andjudgment normal.       Assessment:      Diagnosis Orders   1. Visit for gynecologic examination  Pap Smear      2. Screening for HPV (human papillomavirus)  Pap Smear      3. Visit for screening mammogram  PAGE DIGITAL SCREEN W OR WO CAD BILATERAL      4. IUD check up            Body mass index is 26.56 kg/m². Obesity:  Overweight  Smoking:  No    Plan:   PAP SMEAR : indicated:  performed. Breast exam : Normal   Mirena IUD , new Mirena IUD ordered. Advised patient that  Mirena IUD might not be necessary at perimenopause , but the patient opted to continue use of Mirena for another 5 years , as her current Mirena IUD has helped her. A lot of irregular bleeding she was experiencing prior to the IUD. Obesity Counseling:  Given  Smoking Counseling:N/A    Orders Placed This Encounter   Procedures    PAGE DIGITAL SCREEN W OR WO CAD BILATERAL     Standing Status:   Future     Standing Expiration Date:   2024     Order Specific Question:   Reason for exam:     Answer:   screening    Pap Smear     Patient History:    No LMP recorded. Patient has had an implant.   OBGYN Status: Implant  Past Surgical History:  No date: ABDOMINOPLASTY  Medications/Contraceptives Affecting Cytology     Progestin Contraceptives - IUD Disp Start End     levonorgestrel (MIRENA) IUD 52 mg 1 each     2018      1 each, IntraUTERine, ONCE    Cosign for Ordering: Accepted by Zander Knutson MD on 2018 11:11 AM     levonorgestrel (MIRENA) 20 MCG/24HR IUD    1 Intra Uterine Device 2013    Si each by Intrauterine route once for 1 dose. Class: NO PRINT    Route: IntraUTERine        Social History    Tobacco Use      Smoking status: Never      Smokeless tobacco: Never       Standing Status:   Future     Number of Occurrences:   1     Standing Expiration Date:   12/6/2023     Order Specific Question:   Collection Type     Answer: Thin Prep     Order Specific Question:   Prior Abnormal Pap Test     Answer:   No     Order Specific Question:   Screening or Diagnostic     Answer:   Screening     Order Specific Question:   HPV Requested? Answer:   Yes     Order Specific Question:   High Risk Patient     Answer:   N/A       No orders of the defined types were placed in this encounter. Follow up:  Return in about 1 year (around 12/6/2023) for next annual exam visit. Christiano Ga MD        HEALTH MAINTENANCE:   Health information given. Women's Health patient information and counselling done. regarding risk/benefits/alternatives to Hormone Therapy and need for yearly re-evaluation. Periodic Pap smear discussed. Mammogram recommended yearly. Colon cancer screening by age 48 & every 5-10years. Bone mineral density (by age 72 or sooner if indication it would affect Hormone Therapy management or other risk factors for osteoporosis). Tim Dos Santos M.D., F.A.C.O. G

## 2023-01-09 ENCOUNTER — HOSPITAL ENCOUNTER (OUTPATIENT)
Dept: WOMENS IMAGING | Age: 53
Discharge: HOME | End: 2023-01-11
Payer: COMMERCIAL

## 2023-01-09 DIAGNOSIS — E55.9 VITAMIN D DEFICIENCY: ICD-10-CM

## 2023-01-09 DIAGNOSIS — Z12.31 VISIT FOR SCREENING MAMMOGRAM: ICD-10-CM

## 2023-01-09 PROCEDURE — 77067 SCR MAMMO BI INCL CAD: CPT

## 2023-01-09 RX ORDER — ERGOCALCIFEROL 1.25 MG/1
50000 CAPSULE ORAL WEEKLY
Qty: 12 CAPSULE | Refills: 3 | Status: SHIPPED | OUTPATIENT
Start: 2023-01-09

## 2023-01-09 NOTE — TELEPHONE ENCOUNTER
Patient requesting medication refill.  Please approve or deny this request.    Rx requested:  Requested Prescriptions     Pending Prescriptions Disp Refills    vitamin D (ERGOCALCIFEROL) 1.25 MG (51925 UT) CAPS capsule 12 capsule 3     Sig: Take 1 capsule by mouth once a week         Last Office Visit:   7/9/2022      Next Visit Date:  Future Appointments   Date Time Provider Shay Byrd   1/9/2023 10:20 AM [facility]   1/27/2023 10:30 AM Marielena Arias MD [place]

## 2023-01-27 ENCOUNTER — OFFICE VISIT (OUTPATIENT)
Dept: ENDOCRINOLOGY | Age: 53
End: 2023-01-27
Payer: COMMERCIAL

## 2023-01-27 VITALS
RESPIRATION RATE: 18 BRPM | WEIGHT: 135 LBS | HEART RATE: 64 BPM | SYSTOLIC BLOOD PRESSURE: 134 MMHG | TEMPERATURE: 97.8 F | OXYGEN SATURATION: 98 % | DIASTOLIC BLOOD PRESSURE: 82 MMHG | BODY MASS INDEX: 26.5 KG/M2 | HEIGHT: 60 IN

## 2023-01-27 DIAGNOSIS — E03.9 HYPOTHYROIDISM, UNSPECIFIED TYPE: Primary | ICD-10-CM

## 2023-01-27 DIAGNOSIS — E55.9 VITAMIN D DEFICIENCY: ICD-10-CM

## 2023-01-27 DIAGNOSIS — E78.5 HYPERLIPIDEMIA, UNSPECIFIED HYPERLIPIDEMIA TYPE: ICD-10-CM

## 2023-01-27 PROCEDURE — 99213 OFFICE O/P EST LOW 20 MIN: CPT | Performed by: INTERNAL MEDICINE

## 2023-01-27 NOTE — PROGRESS NOTES
1/27/2023    Assessment:       Diagnosis Orders   1. Hypothyroidism, unspecified type        2. Vitamin D deficiency        3. Hyperlipidemia, unspecified hyperlipidemia type              PLAN:     Patient to get lab work done for lipid panel and thyroid panel in the next 4 to 5 days further management to depend upon the results of labs    Follow-up in 6 months  Subjective:     Chief Complaint   Patient presents with    Thyroid Problem     Vitals:    01/27/23 1023   BP: 134/82   Site: Left Upper Arm   Position: Sitting   Cuff Size: Small Adult   Pulse: 64   Resp: 18   Temp: 97.8 °F (36.6 °C)   TempSrc: Temporal   SpO2: 98%   Weight: 135 lb (61.2 kg)   Height: 5' (1.524 m)     Wt Readings from Last 3 Encounters:   01/27/23 135 lb (61.2 kg)   12/06/22 136 lb (61.7 kg)   07/09/22 132 lb (59.9 kg)     BP Readings from Last 3 Encounters:   01/27/23 134/82   12/06/22 122/86   01/08/22 128/76     Follow-up on hypothyroidism hyperlipidemia no recent labs to review patient will have labs in the next few day currently on Synthroid 100 mcg daily    Thyroid Problem  Presents for follow-up visit. Symptoms include fatigue. Patient reports no cold intolerance or heat intolerance.    Past Medical History:   Diagnosis Date    History of mammogram 03/20/2012    History of ovarian cyst 2013    Hyperlipidemia LDL goal <130     Hypothyroidism     Dr Sánchez Carbon    Leukopenia     constitutional    Raynaud phenomenon 2015     Past Surgical History:   Procedure Laterality Date    ABDOMINOPLASTY       Social History     Socioeconomic History    Marital status:      Spouse name: Not on file    Number of children: 3    Years of education: Not on file    Highest education level: Not on file   Occupational History    Occupation: homemaker, [employer]   Tobacco Use    Smoking status: Never    Smokeless tobacco: Never   Substance and Sexual Activity    Alcohol use: No    Drug use: No    Sexual activity: Not on file   Other Topics Concern    Not on file   Social History Narrative    Native of [facility]    Lives in a house in [place] with spouse (pulmonologist)     3 children in college    Works at [employer] Strain: Not on [employer] Insecurity: Not on file   Transportation Needs: Not on file   Physical Activity: Not on file   Stress: Not on file   Social Connections: Not on file   Intimate Partner Violence: Not on file   Housing Stability: Not on file     Family History   Problem Relation Age of Onset    Breast Cancer Mother     High Cholesterol Father     Breast Cancer Maternal Aunt      Allergies   Allergen Reactions    Doxycycline        Current Outpatient Medications:     vitamin D (ERGOCALCIFEROL) 1.25 MG (70433 UT) CAPS capsule, Take 1 capsule by mouth once a week, Disp: 12 capsule, Rfl: 3    Biotin w/ Vitamins C & E (HAIR/SKIN/NAILS) 1250-7.5-7.5 MCG-MG-UNT CHEW, Take by mouth, Disp: , Rfl:     SYNTHROID 100 MCG tablet, Take 1 tablet by mouth Daily, Disp: 90 tablet, Rfl: 3    clindamycin-tretinoin (ZIANA) 1.2-0.025 % gel, APPLY AS DIRECTED, Disp: 180 g, Rfl: 0    calcium carbonate (OSCAL) 500 MG TABS tablet, Take 500 mg by mouth daily, Disp: , Rfl:     Omega-3 Fatty Acids (FISH OIL PO), Take  by mouth., Disp: , Rfl:     ferrous sulfate 325 (65 FE) MG tablet, Take 325 mg by mouth daily (with breakfast).   , Disp: , Rfl:     MULTIPLE VITAMINS PO, Take  by mouth.  , Disp: , Rfl:     rosuvastatin (CRESTOR) 10 MG tablet, Take 1 tablet by mouth nightly (Patient not taking: Reported on 1/27/2023), Disp: 90 tablet, Rfl: 3    levonorgestrel (MIRENA) 20 MCG/24HR IUD, 1 each by Intrauterine route once for 1 dose., Disp: 1 Intra Uterine Device, Rfl: 0  Lab Results   Component Value Date     03/22/2022    K 4.6 03/22/2022     03/22/2022    CO2 24 03/22/2022    BUN 13 03/22/2022    CREATININE 0.43 (L) 03/22/2022    GLUCOSE 86 03/22/2022    CALCIUM 9.3 03/22/2022    PROT 7.0 09/08/2015    LABALBU 4.7 09/08/2015    BILITOT 0.6 09/08/2015    ALKPHOS 38 (L) 09/08/2015    AST 22 09/08/2015    ALT 18 09/08/2015    LABGLOM >60.0 03/22/2022    GFRAA >60.0 03/22/2022     Lab Results   Component Value Date    WBC 2.9 (L) 03/23/2018    HGB 14.0 03/23/2018    HCT 41.1 03/23/2018    MCV 97.8 03/23/2018     03/23/2018     Lab Results   Component Value Date    LABA1C 5.4 08/25/2020     Lab Results   Component Value Date    HDL 59 03/22/2022    HDL 69 (H) 07/08/2021    HDL 68 (H) 08/25/2020    LDLCALC 138 (H) 03/22/2022    LDLCALC 170 (H) 07/08/2021    LDLCALC 144 (H) 08/25/2020    CHOL 222 (H) 03/22/2022    CHOL 257 (H) 07/08/2021    CHOL 229 (H) 08/25/2020    TRIG 124 03/22/2022    TRIG 92 07/08/2021    TRIG 83 08/25/2020     No results found for: TESTM  Lab Results   Component Value Date    TSH 0.015 (L) 03/22/2022    TSH 5.600 (H) 07/08/2021    TSH 0.441 11/18/2015    TSHREFLEX 3.370 02/11/2020    T4FREE 1.69 (H) 03/22/2022    T4FREE 1.09 07/08/2021    T4FREE 1.27 02/11/2020     No results found for: TPOABS    Review of Systems   Constitutional:  Positive for fatigue. Cardiovascular: Negative. Endocrine: Negative. Negative for cold intolerance and heat intolerance. All other systems reviewed and are negative. Objective:   Physical Exam  Vitals reviewed. Constitutional:       General: She is not in acute distress. Appearance: Normal appearance. HENT:      Head: Normocephalic and atraumatic. Right Ear: External ear normal.      Left Ear: External ear normal.      Nose: Nose normal.   Eyes:      General: No scleral icterus. Right eye: No discharge. Left eye: No discharge. Extraocular Movements: Extraocular movements intact. Conjunctiva/sclera: Conjunctivae normal.   Cardiovascular:      Rate and Rhythm: Normal rate. Pulmonary:      Effort: Pulmonary effort is normal.   Musculoskeletal:         General: Normal range of motion.       Cervical back: Normal range of motion and neck supple. Neurological:      General: No focal deficit present. Mental Status: She is alert and oriented to person, place, and time.    Psychiatric:         Mood and Affect: Mood normal.         Behavior: Behavior normal.

## 2023-01-31 DIAGNOSIS — E03.9 HYPOTHYROIDISM, UNSPECIFIED TYPE: ICD-10-CM

## 2023-01-31 DIAGNOSIS — E78.5 HYPERLIPIDEMIA, UNSPECIFIED HYPERLIPIDEMIA TYPE: ICD-10-CM

## 2023-01-31 LAB
CHOLESTEROL, TOTAL: 209 MG/DL (ref 0–199)
HDLC SERPL-MCNC: 58 MG/DL (ref 40–59)
LDL CHOLESTEROL CALCULATED: 131 MG/DL (ref 0–129)
T4 FREE: 1.34 NG/DL (ref 0.84–1.68)
TRIGL SERPL-MCNC: 99 MG/DL (ref 0–150)
TSH REFLEX: 0.05 UIU/ML (ref 0.44–3.86)

## 2023-02-01 ENCOUNTER — PROCEDURE VISIT (OUTPATIENT)
Dept: OBGYN CLINIC | Age: 53
End: 2023-02-01

## 2023-02-01 VITALS
DIASTOLIC BLOOD PRESSURE: 80 MMHG | SYSTOLIC BLOOD PRESSURE: 112 MMHG | HEART RATE: 72 BPM | HEIGHT: 60 IN | BODY MASS INDEX: 27.09 KG/M2 | WEIGHT: 138 LBS

## 2023-02-01 DIAGNOSIS — Z30.433 ENCOUNTER FOR IUD REMOVAL AND REINSERTION: ICD-10-CM

## 2023-02-01 DIAGNOSIS — N92.6 IRREGULAR MENSES: Primary | ICD-10-CM

## 2023-02-01 RX ORDER — ESTRADIOL 0.04 MG/D
1 PATCH TRANSDERMAL WEEKLY
Qty: 4 PATCH | Refills: 1 | Status: SHIPPED | OUTPATIENT
Start: 2023-02-01

## 2023-02-06 NOTE — PROGRESS NOTES
IUD Insertion Procedure Note    Pre-operative Diagnosis: irregular vaginal bleeding    Post-operative Diagnosis: same    Indications: irregular vaginal bleeding    Procedure Details   Urine pregnancy test was done and result was negative. The risks (including infection, bleeding, pain, and uterine perforation) and benefits of the procedure were explained to the patient and Written informed consent was obtained. Cervix cleansed with Betadine. Uterus sounded to 7 cm. IUD inserted without difficulty. String visible and trimmed. Patient tolerated procedure well. IUD Information:  Mirena. Condition:  Stable    Complications:  None    Plan:    The patient was advised to call for any fever or for prolonged or severe pain or bleeding. She was advised to use OTC ibuprofen as needed for mild to moderate pain. Follow up:  No follow-ups on file.       Wiliam Shanks MD

## 2023-02-06 NOTE — PROGRESS NOTES
IUD Removal Procedure Note    Pre-operative Diagnosis:  IUD     Post-operative Diagnosis: same    Indications: irregular vaginal bleeding    Procedure Details   Urine pregnancy test was not done. The risks (including infection, bleeding, pain, and uterine perforation) and benefits of the procedure were explained to the patient and Verbal informed consent was obtained. Upon sterile speculum exam, cervix exposed, IUD string seen at cervical OS, grasped with ring forceps and pulled with no complications. IUD Information:  Mirena. Condition:  Stable    Complications:  None    Plan:  Insert new Mirena IUD , see next note       Follow up:  No follow-ups on file.       Wilfrido Funez MD

## 2023-06-02 DIAGNOSIS — E03.9 HYPOTHYROIDISM: ICD-10-CM

## 2023-06-02 DIAGNOSIS — E55.9 VITAMIN D DEFICIENCY: ICD-10-CM

## 2023-06-02 DIAGNOSIS — E78.00 HYPERCHOLESTEREMIA: ICD-10-CM

## 2023-06-02 RX ORDER — LEVOTHYROXINE SODIUM 0.1 MG/1
100 TABLET ORAL DAILY
Qty: 90 TABLET | Refills: 3 | Status: SHIPPED | OUTPATIENT
Start: 2023-06-02

## 2023-06-02 NOTE — TELEPHONE ENCOUNTER
Patient requesting medication refill. Please approve or deny this request.    Rx requested:  Requested Prescriptions     Pending Prescriptions Disp Refills    levothyroxine (SYNTHROID) 100 MCG tablet 90 tablet 03     Sig: Take 1 tablet by mouth daily         Last Office Visit:   1/27/2023      Next Visit Date:  No future appointments.

## 2023-06-19 ENCOUNTER — TELEPHONE (OUTPATIENT)
Dept: ENDOCRINOLOGY | Age: 53
End: 2023-06-19

## 2023-06-19 NOTE — TELEPHONE ENCOUNTER
Received a PA form from [employer] , requesting more information.  Faxed today 6-  Left a message for patient

## 2023-06-22 DIAGNOSIS — E55.9 VITAMIN D DEFICIENCY: ICD-10-CM

## 2023-06-22 RX ORDER — ERGOCALCIFEROL 1.25 MG/1
50000 CAPSULE ORAL WEEKLY
Qty: 12 CAPSULE | Refills: 3 | Status: SHIPPED | OUTPATIENT
Start: 2023-06-22

## 2023-08-29 ENCOUNTER — TELEPHONE (OUTPATIENT)
Dept: PRIMARY CARE CLINIC | Age: 53
End: 2023-08-29

## 2023-08-29 DIAGNOSIS — E78.00 HYPERCHOLESTEREMIA: ICD-10-CM

## 2023-08-29 DIAGNOSIS — E78.49 OTHER HYPERLIPIDEMIA: ICD-10-CM

## 2023-08-29 DIAGNOSIS — E78.5 HYPERLIPIDEMIA, UNSPECIFIED HYPERLIPIDEMIA TYPE: ICD-10-CM

## 2023-08-29 DIAGNOSIS — E03.9 HYPOTHYROIDISM, UNSPECIFIED TYPE: Primary | ICD-10-CM

## 2023-08-29 DIAGNOSIS — E03.8 OTHER SPECIFIED HYPOTHYROIDISM: Primary | ICD-10-CM

## 2023-08-29 NOTE — TELEPHONE ENCOUNTER
----- Message from Dave Azevedo sent at 8/29/2023  9:41 AM EDT -----  Subject: Referral Request    Reason for referral request? patient is requesting for cholesterol and   thyroid blood work orders to be put in before her appointment with Dr. Lesia Woodruff  Provider patient wants to be referred to(if known):     Provider Phone Number(if known):     Additional Information for Provider?   ---------------------------------------------------------------------------  --------------  [address] [place]    [phone]; OK to leave message on voicemail  ---------------------------------------------------------------------------  --------------

## 2023-08-30 DIAGNOSIS — E78.00 HYPERCHOLESTEREMIA: ICD-10-CM

## 2023-08-30 DIAGNOSIS — E78.5 HYPERLIPIDEMIA, UNSPECIFIED HYPERLIPIDEMIA TYPE: ICD-10-CM

## 2023-08-30 DIAGNOSIS — E03.9 HYPOTHYROIDISM, UNSPECIFIED TYPE: ICD-10-CM

## 2023-08-30 LAB
ANION GAP SERPL CALCULATED.3IONS-SCNC: 11 MEQ/L (ref 9–15)
BUN SERPL-MCNC: 9 MG/DL (ref 6–20)
CALCIUM SERPL-MCNC: 9.5 MG/DL (ref 8.5–9.9)
CHLORIDE SERPL-SCNC: 107 MEQ/L (ref 95–107)
CHOLEST SERPL-MCNC: 234 MG/DL (ref 0–199)
CO2 SERPL-SCNC: 23 MEQ/L (ref 20–31)
CREAT SERPL-MCNC: 0.55 MG/DL (ref 0.5–0.9)
GLUCOSE SERPL-MCNC: 90 MG/DL (ref 70–99)
HDLC SERPL-MCNC: 59 MG/DL (ref 40–59)
LDL CHOLESTEROL CALCULATED: 152 MG/DL (ref 0–129)
POTASSIUM SERPL-SCNC: 4.4 MEQ/L (ref 3.4–4.9)
SODIUM SERPL-SCNC: 141 MEQ/L (ref 135–144)
T4 FREE SERPL-MCNC: 1.56 NG/DL (ref 0.84–1.68)
TRIGLYCERIDE, FASTING: 117 MG/DL (ref 0–150)
TSH REFLEX: 0.07 UIU/ML (ref 0.44–3.86)

## 2023-09-01 ENCOUNTER — OFFICE VISIT (OUTPATIENT)
Dept: ENDOCRINOLOGY | Age: 53
End: 2023-09-01
Payer: COMMERCIAL

## 2023-09-01 VITALS
SYSTOLIC BLOOD PRESSURE: 120 MMHG | HEART RATE: 62 BPM | DIASTOLIC BLOOD PRESSURE: 75 MMHG | HEIGHT: 60 IN | BODY MASS INDEX: 27.88 KG/M2 | OXYGEN SATURATION: 98 % | WEIGHT: 142 LBS

## 2023-09-01 DIAGNOSIS — E78.00 HYPERCHOLESTEREMIA: ICD-10-CM

## 2023-09-01 DIAGNOSIS — E78.5 HYPERLIPIDEMIA, UNSPECIFIED HYPERLIPIDEMIA TYPE: ICD-10-CM

## 2023-09-01 DIAGNOSIS — E03.9 HYPOTHYROIDISM, UNSPECIFIED TYPE: Primary | ICD-10-CM

## 2023-09-01 DIAGNOSIS — R63.5 WEIGHT GAIN: ICD-10-CM

## 2023-09-01 DIAGNOSIS — E55.9 VITAMIN D DEFICIENCY: ICD-10-CM

## 2023-09-01 PROCEDURE — 99213 OFFICE O/P EST LOW 20 MIN: CPT | Performed by: INTERNAL MEDICINE

## 2023-09-01 RX ORDER — LEVOTHYROXINE SODIUM 0.07 MG/1
75 TABLET ORAL DAILY
Qty: 90 TABLET | Refills: 3 | Status: SHIPPED | OUTPATIENT
Start: 2023-09-01

## 2023-09-01 RX ORDER — PHENTERMINE HYDROCHLORIDE 37.5 MG/1
37.5 TABLET ORAL
Qty: 30 TABLET | Refills: 2 | Status: SHIPPED | OUTPATIENT
Start: 2023-09-01 | End: 2023-10-01

## 2023-09-01 RX ORDER — ERGOCALCIFEROL 1.25 MG/1
50000 CAPSULE ORAL WEEKLY
Qty: 12 CAPSULE | Refills: 3 | Status: SHIPPED | OUTPATIENT
Start: 2023-09-01

## 2023-09-01 NOTE — PROGRESS NOTES
Problem Relation Age of Onset    Breast Cancer Mother     High Cholesterol Father     Breast Cancer Maternal Aunt      Allergies   Allergen Reactions    Doxycycline        Current Outpatient Medications:     vitamin D (ERGOCALCIFEROL) 1.25 MG (13772 UT) CAPS capsule, Take 1 capsule by mouth once a week, Disp: 12 capsule, Rfl: 3    levothyroxine (SYNTHROID) 100 MCG tablet, Take 1 tablet by mouth daily, Disp: 90 tablet, Rfl: 03    estradiol (CLIMARA) 0.0375 MG/24HR, Place 1 patch onto the skin once a week, Disp: 4 patch, Rfl: 1    Biotin w/ Vitamins C & E (HAIR/SKIN/NAILS) 1250-7.5-7.5 MCG-MG-UNT CHEW, Take by mouth, Disp: , Rfl:     SYNTHROID 100 MCG tablet, Take 1 tablet by mouth Daily, Disp: 90 tablet, Rfl: 3    rosuvastatin (CRESTOR) 10 MG tablet, Take 1 tablet by mouth nightly, Disp: 90 tablet, Rfl: 3    clindamycin-tretinoin (ZIANA) 1.2-0.025 % gel, APPLY AS DIRECTED, Disp: 180 g, Rfl: 0    calcium carbonate (OSCAL) 500 MG TABS tablet, Take 1 tablet by mouth daily, Disp: , Rfl:     Omega-3 Fatty Acids (FISH OIL PO), Take  by mouth., Disp: , Rfl:     ferrous sulfate 325 (65 FE) MG tablet, Take 1 tablet by mouth daily (with breakfast), Disp: , Rfl:     MULTIPLE VITAMINS PO, Take  by mouth.  , Disp: , Rfl:     levonorgestrel (MIRENA) 20 MCG/24HR IUD, 1 each by Intrauterine route once for 1 dose., Disp: 1 Intra Uterine Device, Rfl: 0  Lab Results   Component Value Date     08/30/2023    K 4.4 08/30/2023     08/30/2023    CO2 23 08/30/2023    BUN 9 08/30/2023    CREATININE 0.55 08/30/2023    GLUCOSE 90 08/30/2023    CALCIUM 9.5 08/30/2023    PROT 7.0 09/08/2015    LABALBU 4.7 09/08/2015    BILITOT 0.6 09/08/2015    ALKPHOS 38 (L) 09/08/2015    AST 22 09/08/2015    ALT 18 09/08/2015    LABGLOM >60.0 08/30/2023    GFRAA >60.0 03/22/2022     Lab Results   Component Value Date    WBC 2.9 (L) 03/23/2018    HGB 14.0 03/23/2018    HCT 41.1 03/23/2018    MCV 97.8 03/23/2018     03/23/2018     Lab Results

## 2023-11-22 ENCOUNTER — TELEPHONE (OUTPATIENT)
Dept: ENDOCRINOLOGY | Age: 53
End: 2023-11-22

## 2023-11-22 NOTE — TELEPHONE ENCOUNTER
Patient is requesting a refill on Crestor. According to Dr. Lovett's office note she is not taking this. If it is okay to send refill, can you send it to [facility]. Please advise. Thanks!

## 2023-11-27 DIAGNOSIS — E55.9 VITAMIN D DEFICIENCY: ICD-10-CM

## 2023-11-27 DIAGNOSIS — E78.00 HYPERCHOLESTEREMIA: ICD-10-CM

## 2023-11-27 RX ORDER — ROSUVASTATIN CALCIUM 10 MG/1
10 TABLET, COATED ORAL NIGHTLY
Qty: 90 TABLET | Refills: 3 | Status: SHIPPED | OUTPATIENT
Start: 2023-11-27

## 2023-11-27 RX ORDER — LEVOTHYROXINE SODIUM 75 MCG
75 TABLET ORAL DAILY
Qty: 90 TABLET | Refills: 3 | Status: SHIPPED | OUTPATIENT
Start: 2023-11-27

## 2023-11-27 NOTE — PROGRESS NOTES
This patient had a lipid panel 8/30/2023, showed elevated LDL at 152 with elevated total cholesterol of 234. She calls requesting a refill of her rosuvastatin. I sent in a prescription for 10 mg by mouth daily to [address] [place] [address].

## 2024-01-02 ENCOUNTER — TELEPHONE (OUTPATIENT)
Dept: ENDOCRINOLOGY | Age: 54
End: 2024-01-02

## 2024-01-02 NOTE — TELEPHONE ENCOUNTER
Pt stated that she is at the pharmacy and they told her that they do not have Adipex for her.    Pt wants to know if Dr. Lovett will call it in.    Pharmacy is [facility] in [place] on [facility]  [id number] [phone]

## 2024-01-02 NOTE — TELEPHONE ENCOUNTER
Pt aware that she needs to be see in the office and has scheduled an office visit for 1/4/2024 at 11:15am

## 2024-01-04 ENCOUNTER — OFFICE VISIT (OUTPATIENT)
Dept: ENDOCRINOLOGY | Age: 54
End: 2024-01-04
Payer: COMMERCIAL

## 2024-01-04 VITALS
DIASTOLIC BLOOD PRESSURE: 82 MMHG | HEIGHT: 60 IN | OXYGEN SATURATION: 94 % | WEIGHT: 141 LBS | BODY MASS INDEX: 27.68 KG/M2 | SYSTOLIC BLOOD PRESSURE: 122 MMHG | HEART RATE: 69 BPM

## 2024-01-04 DIAGNOSIS — E03.9 HYPOTHYROIDISM, UNSPECIFIED TYPE: Primary | ICD-10-CM

## 2024-01-04 DIAGNOSIS — E78.00 HYPERCHOLESTEREMIA: ICD-10-CM

## 2024-01-04 DIAGNOSIS — R63.5 WEIGHT GAIN: ICD-10-CM

## 2024-01-04 PROCEDURE — 99213 OFFICE O/P EST LOW 20 MIN: CPT | Performed by: INTERNAL MEDICINE

## 2024-01-04 RX ORDER — PHENTERMINE HYDROCHLORIDE 37.5 MG/1
37.5 TABLET ORAL
Qty: 30 TABLET | Refills: 2 | Status: SHIPPED | OUTPATIENT
Start: 2024-01-04 | End: 2024-02-03

## 2024-01-04 RX ORDER — PHENTERMINE HYDROCHLORIDE 37.5 MG/1
37.5 TABLET ORAL
Qty: 30 TABLET | Refills: 2 | Status: SHIPPED | OUTPATIENT
Start: 2024-01-04 | End: 2024-01-04 | Stop reason: SDUPTHER

## 2024-01-04 NOTE — PROGRESS NOTES
Diagnosis Date    History of mammogram 03/20/2012    History of ovarian cyst 2013    Hyperlipidemia LDL goal <130     Hypothyroidism     Dr Lovett    Leukopenia     constitutional    Raynaud phenomenon 2015     Past Surgical History:   Procedure Laterality Date    ABDOMINOPLASTY       Social History     Socioeconomic History    Marital status:      Spouse name: Not on file    Number of children: 3    Years of education: Not on file    Highest education level: Not on file   Occupational History    Occupation: homemaker, [employer]   Tobacco Use    Smoking status: Never    Smokeless tobacco: Never   Substance and Sexual Activity    Alcohol use: No    Drug use: No    Sexual activity: Not on file   Other Topics Concern    Not on file   Social History Narrative    Native of [place]    Lives in a house in [place] with spouse (pulmonologist)     3 children in college    Works at [employer]     Social Determinants of Health     Financial Resource Strain: Low Risk  (7/30/2021)    Overall Financial Resource Strain (CARDIA)     Difficulty of Paying Living Expenses: Not hard at all   Food Insecurity: No Food Insecurity (7/30/2021)    Hunger Vital Sign     Worried About Running Out of Food in the Last Year: Never true     Ran Out of Food in the Last Year: Never true   Transportation Needs: No Transportation Needs (7/30/2021)    PRAPARE - Transportation     Lack of Transportation (Medical): No     Lack of Transportation (Non-Medical): No   Physical Activity: Not on file   Stress: Not on file   Social Connections: Not on file   Intimate Partner Violence: Not on file   Housing Stability: Not on file     Family History   Problem Relation Age of Onset    Breast Cancer Mother     High Cholesterol Father     Breast Cancer Maternal Aunt      Allergies   Allergen Reactions    Doxycycline        Current Outpatient Medications:     rosuvastatin (CRESTOR) 10 MG tablet, Take 1 tablet by mouth nightly, Disp: 90 tablet, Rfl: 3    SYNTHROID

## 2024-01-04 NOTE — TELEPHONE ENCOUNTER
Patient requesting medication refill. Please approve or deny this request.    Rx requested:  Requested Prescriptions     Pending Prescriptions Disp Refills    phentermine (ADIPEX-P) 37.5 MG tablet 30 tablet 2     Sig: Take 1 tablet by mouth every morning (before breakfast) for 30 days. Max Daily Amount: 37.5 mg         Last Office Visit:   1/4/2024      Next Visit Date:  Future Appointments   Date Time Provider Department Center   2/2/2024 10:00 AM Gagan Lovett MD [facility]   4/5/2024 11:15 AM Gagan Lovett MD [facility]

## 2024-01-24 ENCOUNTER — HOSPITAL ENCOUNTER (OUTPATIENT)
Dept: WOMENS IMAGING | Age: 54
Discharge: HOME | End: 2024-01-26
Payer: COMMERCIAL

## 2024-01-24 DIAGNOSIS — Z12.31 ENCOUNTER FOR SCREENING MAMMOGRAM FOR MALIGNANT NEOPLASM OF BREAST: ICD-10-CM

## 2024-01-24 PROCEDURE — 77063 BREAST TOMOSYNTHESIS BI: CPT

## 2024-04-05 DIAGNOSIS — R63.5 WEIGHT GAIN: ICD-10-CM

## 2024-04-05 DIAGNOSIS — E03.9 HYPOTHYROIDISM, UNSPECIFIED TYPE: ICD-10-CM

## 2024-04-05 LAB
ANION GAP SERPL CALCULATED.3IONS-SCNC: 12 MEQ/L (ref 9–15)
BUN SERPL-MCNC: 11 MG/DL (ref 6–20)
CALCIUM SERPL-MCNC: 9 MG/DL (ref 8.5–9.9)
CHLORIDE SERPL-SCNC: 108 MEQ/L (ref 95–107)
CHOLEST SERPL-MCNC: 143 MG/DL (ref 0–199)
CO2 SERPL-SCNC: 24 MEQ/L (ref 20–31)
CREAT SERPL-MCNC: 0.51 MG/DL (ref 0.5–0.9)
GLUCOSE SERPL-MCNC: 87 MG/DL (ref 70–99)
HDLC SERPL-MCNC: 74 MG/DL (ref 40–59)
LDLC SERPL CALC-MCNC: 58 MG/DL (ref 0–129)
POTASSIUM SERPL-SCNC: 4.2 MEQ/L (ref 3.4–4.9)
SODIUM SERPL-SCNC: 144 MEQ/L (ref 135–144)
T4 FREE SERPL-MCNC: 1.05 NG/DL (ref 0.84–1.68)
TRIGL SERPL-MCNC: 55 MG/DL (ref 0–150)
TSH REFLEX: 6.82 UIU/ML (ref 0.44–3.86)

## 2024-04-29 ENCOUNTER — OFFICE VISIT (OUTPATIENT)
Dept: ENDOCRINOLOGY | Age: 54
End: 2024-04-29
Payer: COMMERCIAL

## 2024-04-29 VITALS
HEIGHT: 60 IN | DIASTOLIC BLOOD PRESSURE: 75 MMHG | OXYGEN SATURATION: 94 % | HEART RATE: 86 BPM | WEIGHT: 137 LBS | SYSTOLIC BLOOD PRESSURE: 128 MMHG | BODY MASS INDEX: 26.9 KG/M2

## 2024-04-29 DIAGNOSIS — E03.9 HYPOTHYROIDISM, UNSPECIFIED TYPE: Primary | ICD-10-CM

## 2024-04-29 DIAGNOSIS — E78.5 HYPERLIPIDEMIA, UNSPECIFIED HYPERLIPIDEMIA TYPE: ICD-10-CM

## 2024-04-29 DIAGNOSIS — E55.9 VITAMIN D DEFICIENCY: ICD-10-CM

## 2024-04-29 PROCEDURE — 99213 OFFICE O/P EST LOW 20 MIN: CPT | Performed by: INTERNAL MEDICINE

## 2024-04-29 RX ORDER — LEVOTHYROXINE SODIUM 88 MCG
88 TABLET ORAL DAILY
Qty: 90 TABLET | Refills: 3 | Status: SHIPPED | OUTPATIENT
Start: 2024-04-29

## 2024-04-29 RX ORDER — LEVOTHYROXINE SODIUM 75 MCG
75 TABLET ORAL DAILY
Qty: 90 TABLET | Refills: 3 | Status: CANCELLED | OUTPATIENT
Start: 2024-04-29

## 2024-04-29 RX ORDER — ERGOCALCIFEROL 1.25 MG/1
50000 CAPSULE ORAL WEEKLY
Qty: 12 CAPSULE | Refills: 3 | Status: SHIPPED | OUTPATIENT
Start: 2024-04-29

## 2024-04-29 NOTE — PROGRESS NOTES
Intimate Partner Violence: Not on file   Housing Stability: Not on file     Family History   Problem Relation Age of Onset    Breast Cancer Mother 72    High Cholesterol Father     Breast Cancer Maternal Aunt 48     Allergies   Allergen Reactions    Doxycycline        Current Outpatient Medications:     rosuvastatin (CRESTOR) 10 MG tablet, Take 1 tablet by mouth nightly, Disp: 90 tablet, Rfl: 3    SYNTHROID 75 MCG tablet, Take 1 tablet by mouth Daily, Disp: 90 tablet, Rfl: 3    vitamin D (ERGOCALCIFEROL) 1.25 MG (76588 UT) CAPS capsule, Take 1 capsule by mouth once a week, Disp: 12 capsule, Rfl: 3    estradiol (CLIMARA) 0.0375 MG/24HR, Place 1 patch onto the skin once a week, Disp: 4 patch, Rfl: 1    Biotin w/ Vitamins C & E (HAIR/SKIN/NAILS) 1250-7.5-7.5 MCG-MG-UNT CHEW, Take by mouth, Disp: , Rfl:     clindamycin-tretinoin (ZIANA) 1.2-0.025 % gel, APPLY AS DIRECTED, Disp: 180 g, Rfl: 0    calcium carbonate (OSCAL) 500 MG TABS tablet, Take 1 tablet by mouth daily, Disp: , Rfl:     Omega-3 Fatty Acids (FISH OIL PO), Take  by mouth., Disp: , Rfl:     ferrous sulfate 325 (65 FE) MG tablet, Take 1 tablet by mouth daily (with breakfast), Disp: , Rfl:     MULTIPLE VITAMINS PO, Take  by mouth.  , Disp: , Rfl:     levonorgestrel (MIRENA) 20 MCG/24HR IUD, 1 each by Intrauterine route once for 1 dose., Disp: 1 Intra Uterine Device, Rfl: 0  Lab Results   Component Value Date     04/05/2024    K 4.2 04/05/2024     (H) 04/05/2024    CO2 24 04/05/2024    BUN 11 04/05/2024    CREATININE 0.51 04/05/2024    GLUCOSE 87 04/05/2024    CALCIUM 9.0 04/05/2024    PROT 7.0 09/08/2015    BILITOT 0.6 09/08/2015    ALKPHOS 38 (L) 09/08/2015    AST 22 09/08/2015    ALT 18 09/08/2015    LABGLOM >90.0 04/05/2024    GFRAA >60.0 03/22/2022     Lab Results   Component Value Date    WBC 2.9 (L) 03/23/2018    HGB 14.0 03/23/2018    HCT 41.1 03/23/2018    MCV 97.8 03/23/2018     03/23/2018     Lab Results   Component Value Date

## 2024-05-07 ENCOUNTER — OFFICE VISIT (OUTPATIENT)
Dept: PRIMARY CARE CLINIC | Age: 54
End: 2024-05-07
Payer: COMMERCIAL

## 2024-05-07 VITALS
WEIGHT: 139 LBS | HEART RATE: 82 BPM | DIASTOLIC BLOOD PRESSURE: 82 MMHG | OXYGEN SATURATION: 98 % | BODY MASS INDEX: 27.15 KG/M2 | SYSTOLIC BLOOD PRESSURE: 102 MMHG

## 2024-05-07 DIAGNOSIS — N92.6 IRREGULAR BLEEDING: ICD-10-CM

## 2024-05-07 DIAGNOSIS — Z12.11 COLON CANCER SCREENING: ICD-10-CM

## 2024-05-07 DIAGNOSIS — E78.00 PURE HYPERCHOLESTEROLEMIA: ICD-10-CM

## 2024-05-07 DIAGNOSIS — Z00.00 PREVENTATIVE HEALTH CARE: Primary | ICD-10-CM

## 2024-05-07 PROCEDURE — 99396 PREV VISIT EST AGE 40-64: CPT | Performed by: INTERNAL MEDICINE

## 2024-05-07 RX ORDER — PHENTERMINE HYDROCHLORIDE 37.5 MG/1
TABLET ORAL
COMMUNITY
Start: 2024-02-29

## 2024-05-07 SDOH — ECONOMIC STABILITY: FOOD INSECURITY: WITHIN THE PAST 12 MONTHS, THE FOOD YOU BOUGHT JUST DIDN'T LAST AND YOU DIDN'T HAVE MONEY TO GET MORE.: NEVER TRUE

## 2024-05-07 SDOH — ECONOMIC STABILITY: FOOD INSECURITY: WITHIN THE PAST 12 MONTHS, YOU WORRIED THAT YOUR FOOD WOULD RUN OUT BEFORE YOU GOT MONEY TO BUY MORE.: NEVER TRUE

## 2024-05-07 SDOH — ECONOMIC STABILITY: INCOME INSECURITY: HOW HARD IS IT FOR YOU TO PAY FOR THE VERY BASICS LIKE FOOD, HOUSING, MEDICAL CARE, AND HEATING?: NOT HARD AT ALL

## 2024-05-07 SDOH — ECONOMIC STABILITY: HOUSING INSECURITY
IN THE LAST 12 MONTHS, WAS THERE A TIME WHEN YOU DID NOT HAVE A STEADY PLACE TO SLEEP OR SLEPT IN A SHELTER (INCLUDING NOW)?: NO

## 2024-05-07 ASSESSMENT — PATIENT HEALTH QUESTIONNAIRE - PHQ9
2. FEELING DOWN, DEPRESSED OR HOPELESS: NOT AT ALL
1. LITTLE INTEREST OR PLEASURE IN DOING THINGS: NOT AT ALL
SUM OF ALL RESPONSES TO PHQ QUESTIONS 1-9: 0
SUM OF ALL RESPONSES TO PHQ QUESTIONS 1-9: 0
SUM OF ALL RESPONSES TO PHQ9 QUESTIONS 1 & 2: 0
SUM OF ALL RESPONSES TO PHQ QUESTIONS 1-9: 0
SUM OF ALL RESPONSES TO PHQ QUESTIONS 1-9: 0

## 2024-05-07 NOTE — PROGRESS NOTES
Insecurity (5/7/2024)    Hunger Vital Sign     Worried About Running Out of Food in the Last Year: Never true     Ran Out of Food in the Last Year: Never true   Transportation Needs: Unknown (5/7/2024)    PRAPARE - Transportation     Lack of Transportation (Non-Medical): No   Housing Stability: Unknown (5/7/2024)    Housing Stability Vital Sign     Unstable Housing in the Last Year: No     Allergies   Allergen Reactions    Doxycycline        Review of Systems   Constitutional:  Negative for chills and fever.   HENT:  Negative for facial swelling and nosebleeds.    Eyes:  Negative for photophobia and visual disturbance.   Respiratory:  Negative for apnea and choking.    Cardiovascular:  Negative for chest pain and palpitations.   Gastrointestinal:  Negative for abdominal distention and blood in stool.   Genitourinary:  Negative for enuresis, hematuria and vaginal bleeding.   Musculoskeletal:  Negative for gait problem and joint swelling.   Skin:  Negative for rash.   Neurological:  Negative for syncope and speech difficulty.   Hematological:  Does not bruise/bleed easily.   Psychiatric/Behavioral:  Negative for hallucinations and suicidal ideas.            Vitals:    05/07/24 0824   BP: 102/82   Pulse: 82   SpO2: 98%   Weight: 63 kg (139 lb)       Physical Exam  Constitutional:       Appearance: She is well-developed.   HENT:      Head: Normocephalic.   Eyes:      Conjunctiva/sclera: Conjunctivae normal.   Cardiovascular:      Rate and Rhythm: Normal rate and regular rhythm.      Heart sounds: Normal heart sounds.   Pulmonary:      Effort: No respiratory distress.      Breath sounds: Normal breath sounds. No wheezing.   Abdominal:      General: There is no distension.   Musculoskeletal:         General: Normal range of motion.      Cervical back: Normal range of motion.   Skin:     Coloration: Skin is not jaundiced.   Neurological:      Mental Status: She is alert and oriented to person, place, and time.

## 2024-05-21 ENCOUNTER — PREP FOR PROCEDURE (OUTPATIENT)
Dept: GASTROENTEROLOGY | Age: 54
End: 2024-05-21

## 2024-05-22 RX ORDER — SODIUM CHLORIDE 0.9 % (FLUSH) 0.9 %
5-40 SYRINGE (ML) INJECTION EVERY 12 HOURS SCHEDULED
Status: CANCELLED | OUTPATIENT
Start: 2024-05-22

## 2024-05-22 RX ORDER — SODIUM CHLORIDE 9 MG/ML
INJECTION, SOLUTION INTRAVENOUS CONTINUOUS
Status: CANCELLED | OUTPATIENT
Start: 2024-05-22

## 2024-05-22 RX ORDER — SODIUM CHLORIDE 0.9 % (FLUSH) 0.9 %
5-40 SYRINGE (ML) INJECTION PRN
Status: CANCELLED | OUTPATIENT
Start: 2024-05-22

## 2024-05-22 RX ORDER — SODIUM CHLORIDE 9 MG/ML
INJECTION, SOLUTION INTRAVENOUS PRN
Status: CANCELLED | OUTPATIENT
Start: 2024-05-22

## 2024-07-29 ENCOUNTER — OFFICE VISIT (OUTPATIENT)
Dept: ENDOCRINOLOGY | Age: 54
End: 2024-07-29
Payer: COMMERCIAL

## 2024-07-29 VITALS
HEART RATE: 79 BPM | WEIGHT: 137 LBS | OXYGEN SATURATION: 98 % | HEIGHT: 60 IN | BODY MASS INDEX: 26.9 KG/M2 | DIASTOLIC BLOOD PRESSURE: 81 MMHG | SYSTOLIC BLOOD PRESSURE: 124 MMHG

## 2024-07-29 DIAGNOSIS — E03.9 HYPOTHYROIDISM, UNSPECIFIED TYPE: Primary | ICD-10-CM

## 2024-07-29 DIAGNOSIS — E78.00 PURE HYPERCHOLESTEROLEMIA: ICD-10-CM

## 2024-07-29 DIAGNOSIS — E03.9 HYPOTHYROIDISM, UNSPECIFIED TYPE: ICD-10-CM

## 2024-07-29 DIAGNOSIS — N92.6 IRREGULAR BLEEDING: ICD-10-CM

## 2024-07-29 DIAGNOSIS — E78.5 HYPERLIPIDEMIA, UNSPECIFIED HYPERLIPIDEMIA TYPE: ICD-10-CM

## 2024-07-29 DIAGNOSIS — Z00.00 PREVENTATIVE HEALTH CARE: ICD-10-CM

## 2024-07-29 DIAGNOSIS — E55.9 VITAMIN D DEFICIENCY: ICD-10-CM

## 2024-07-29 LAB
ALBUMIN SERPL-MCNC: 4.5 G/DL (ref 3.5–4.6)
ALP SERPL-CCNC: 56 U/L (ref 40–130)
ALT SERPL-CCNC: 36 U/L (ref 0–33)
ANION GAP SERPL CALCULATED.3IONS-SCNC: 12 MEQ/L (ref 9–15)
ANION GAP SERPL CALCULATED.3IONS-SCNC: 13 MEQ/L (ref 9–15)
AST SERPL-CCNC: 39 U/L (ref 0–35)
BASOPHILS # BLD: 0.1 K/UL (ref 0–0.2)
BASOPHILS NFR BLD: 1 %
BILIRUB SERPL-MCNC: 0.5 MG/DL (ref 0.2–0.7)
BUN SERPL-MCNC: 9 MG/DL (ref 6–20)
BUN SERPL-MCNC: 9 MG/DL (ref 6–20)
CALCIUM SERPL-MCNC: 9.4 MG/DL (ref 8.5–9.9)
CALCIUM SERPL-MCNC: 9.6 MG/DL (ref 8.5–9.9)
CHLORIDE SERPL-SCNC: 103 MEQ/L (ref 95–107)
CHLORIDE SERPL-SCNC: 103 MEQ/L (ref 95–107)
CHOLEST SERPL-MCNC: 170 MG/DL (ref 0–199)
CO2 SERPL-SCNC: 24 MEQ/L (ref 20–31)
CO2 SERPL-SCNC: 26 MEQ/L (ref 20–31)
CREAT SERPL-MCNC: 0.5 MG/DL (ref 0.5–0.9)
CREAT SERPL-MCNC: 0.51 MG/DL (ref 0.5–0.9)
EOSINOPHIL # BLD: 0 K/UL (ref 0–0.7)
EOSINOPHIL NFR BLD: 0.8 %
ERYTHROCYTE [DISTWIDTH] IN BLOOD BY AUTOMATED COUNT: 12.2 % (ref 11.5–14.5)
GLOBULIN SER CALC-MCNC: 2.8 G/DL (ref 2.3–3.5)
GLUCOSE SERPL-MCNC: 83 MG/DL (ref 70–99)
GLUCOSE SERPL-MCNC: 87 MG/DL (ref 70–99)
HCT VFR BLD AUTO: 40.4 % (ref 37–47)
HDLC SERPL-MCNC: 82 MG/DL (ref 40–59)
HGB BLD-MCNC: 13.7 G/DL (ref 12–16)
LDLC SERPL CALC-MCNC: 74 MG/DL (ref 0–129)
LYMPHOCYTES # BLD: 1 K/UL (ref 1–4.8)
LYMPHOCYTES NFR BLD: 20.9 %
MCH RBC QN AUTO: 31.2 PG (ref 27–31.3)
MCHC RBC AUTO-ENTMCNC: 33.9 % (ref 33–37)
MCV RBC AUTO: 92 FL (ref 79.4–94.8)
MONOCYTES # BLD: 0.3 K/UL (ref 0.2–0.8)
MONOCYTES NFR BLD: 5.9 %
NEUTROPHILS # BLD: 3.5 K/UL (ref 1.4–6.5)
NEUTS SEG NFR BLD: 71.2 %
PLATELET # BLD AUTO: 176 K/UL (ref 130–400)
POTASSIUM SERPL-SCNC: 3.8 MEQ/L (ref 3.4–4.9)
POTASSIUM SERPL-SCNC: 4 MEQ/L (ref 3.4–4.9)
PROT SERPL-MCNC: 7.3 G/DL (ref 6.3–8)
RBC # BLD AUTO: 4.39 M/UL (ref 4.2–5.4)
SODIUM SERPL-SCNC: 140 MEQ/L (ref 135–144)
SODIUM SERPL-SCNC: 141 MEQ/L (ref 135–144)
T4 FREE SERPL-MCNC: 1.37 NG/DL (ref 0.84–1.68)
TRIGL SERPL-MCNC: 69 MG/DL (ref 0–150)
TSH SERPL-MCNC: 1.7 UIU/ML (ref 0.44–3.86)
WBC # BLD AUTO: 4.9 K/UL (ref 4.8–10.8)

## 2024-07-29 PROCEDURE — 99213 OFFICE O/P EST LOW 20 MIN: CPT | Performed by: INTERNAL MEDICINE

## 2024-07-29 RX ORDER — TIZANIDINE 2 MG/1
2 TABLET ORAL 3 TIMES DAILY
COMMUNITY
Start: 2024-07-20

## 2024-07-29 NOTE — PROGRESS NOTES
7/29/2024    Assessment:       Diagnosis Orders   1. Hypothyroidism, unspecified type        2. Vitamin D deficiency        3. Hyperlipidemia, unspecified hyperlipidemia type              PLAN:     Orders Placed This Encounter   Procedures    Basic Metabolic Panel     Standing Status:   Future     Number of Occurrences:   1     Standing Expiration Date:   7/29/2025    T4, Free     Standing Status:   Future     Number of Occurrences:   1     Standing Expiration Date:   7/29/2025    TSH     Standing Status:   Future     Number of Occurrences:   1     Standing Expiration Date:   7/29/2025    Vitamin D 25 Hydroxy     Standing Status:   Future     Number of Occurrences:   1     Standing Expiration Date:   7/29/2025    Lipid Panel     Standing Status:   Future     Number of Occurrences:   1     Standing Expiration Date:   7/29/2025     Labs to be done today discussed about compounding Wegovy to lose some weight continue current dose of levothyroxine 88 mcg daily    No orders of the defined types were placed in this encounter.    No orders of the defined types were placed in this encounter.    No follow-ups on file.  Subjective:     Chief Complaint   Patient presents with    Hypothyroidism    Hyperlipidemia    Vitamin D deficiency     Vitals:    07/29/24 1547   BP: 124/81   Pulse: 79   SpO2: 98%   Weight: 62.1 kg (137 lb)   Height: 1.524 m (5')     Wt Readings from Last 3 Encounters:   07/29/24 62.1 kg (137 lb)   05/07/24 63 kg (139 lb)   04/29/24 62.1 kg (137 lb)     BP Readings from Last 3 Encounters:   07/29/24 124/81   05/07/24 102/82   04/29/24 128/75     Follow-up on hypothyroidism on levothyroxine 88 mcg daily labs were done today stable history of hyperlipidemia on Crestor lipid panel also stable.  Get weight down below 25  Has taken Adipex many years ago discussed about compounding Wegovy    Hyperlipidemia  This is a chronic problem. The current episode started more than 1 year ago. The problem is controlled.

## 2024-07-30 LAB — VITAMIN D 25-HYDROXY: 61.7 NG/ML (ref 30–100)

## 2024-10-22 RX ORDER — ROSUVASTATIN CALCIUM 10 MG/1
10 TABLET, COATED ORAL NIGHTLY
Qty: 90 TABLET | Refills: 3 | Status: SHIPPED | OUTPATIENT
Start: 2024-10-22

## 2024-11-05 ENCOUNTER — TELEPHONE (OUTPATIENT)
Dept: ENDOCRINOLOGY | Age: 54
End: 2024-11-05

## 2024-11-05 NOTE — TELEPHONE ENCOUNTER
Patient was last seen 7/29/2024 and rescheduled another appt for 12/26/2024 with Dr Lovett, (no openings until end of December).  She is also asking for a refill of the weight loss injection (she isn't sure the name) to [facility]?  Please advise, and ok to leave a msg if she can not answer.

## 2025-01-30 ENCOUNTER — HOSPITAL ENCOUNTER (OUTPATIENT)
Dept: WOMENS IMAGING | Age: 55
Discharge: HOME | End: 2025-02-01
Payer: COMMERCIAL

## 2025-01-30 ENCOUNTER — TRANSCRIBE ORDERS (OUTPATIENT)
Dept: WOMENS IMAGING | Age: 55
End: 2025-01-30

## 2025-01-30 DIAGNOSIS — Z12.31 SCREENING MAMMOGRAM FOR BREAST CANCER: ICD-10-CM

## 2025-01-30 DIAGNOSIS — Z12.31 SCREENING MAMMOGRAM FOR BREAST CANCER: Primary | ICD-10-CM

## 2025-01-30 PROCEDURE — 77063 BREAST TOMOSYNTHESIS BI: CPT

## 2025-03-17 DIAGNOSIS — E55.9 VITAMIN D DEFICIENCY: ICD-10-CM

## 2025-03-17 RX ORDER — ERGOCALCIFEROL 1.25 MG/1
50000 CAPSULE, LIQUID FILLED ORAL WEEKLY
Qty: 12 CAPSULE | Refills: 3 | Status: SHIPPED | OUTPATIENT
Start: 2025-03-17

## 2025-04-07 RX ORDER — LEVOTHYROXINE SODIUM 88 MCG
88 TABLET ORAL DAILY
Qty: 90 TABLET | Refills: 3 | Status: SHIPPED | OUTPATIENT
Start: 2025-04-07

## 2025-04-07 NOTE — TELEPHONE ENCOUNTER
Requested Prescriptions     Pending Prescriptions Disp Refills    SYNTHROID 88 MCG tablet 90 tablet 3     Sig: Take 1 tablet by mouth Daily